# Patient Record
Sex: FEMALE | Race: WHITE | NOT HISPANIC OR LATINO
[De-identification: names, ages, dates, MRNs, and addresses within clinical notes are randomized per-mention and may not be internally consistent; named-entity substitution may affect disease eponyms.]

---

## 2022-04-05 PROBLEM — Z00.00 ENCOUNTER FOR PREVENTIVE HEALTH EXAMINATION: Status: ACTIVE | Noted: 2022-04-05

## 2022-04-08 ENCOUNTER — APPOINTMENT (OUTPATIENT)
Dept: NEUROSURGERY | Facility: CLINIC | Age: 57
End: 2022-04-08
Payer: MEDICAID

## 2022-04-08 VITALS
HEIGHT: 67 IN | TEMPERATURE: 97.8 F | WEIGHT: 110 LBS | DIASTOLIC BLOOD PRESSURE: 82 MMHG | BODY MASS INDEX: 17.27 KG/M2 | SYSTOLIC BLOOD PRESSURE: 110 MMHG

## 2022-04-08 PROCEDURE — 99204 OFFICE O/P NEW MOD 45 MIN: CPT

## 2022-04-08 NOTE — ASSESSMENT
[FreeTextEntry1] : This is a 57-year-old female with longstanding severe low back pain and lumbar neurogenic claudication.  She has had a full extensive trial of conservative measures over the past several years as well as inability to perform physical therapy epidural steroid injections and medication trials.  In order to complete a neurosurgical evaluation I would like to obtain an MRI lumbar spine without contrast.  She can follow-up with me when the images are completed for my review.  In the meantime she can continue with her pain management options

## 2022-04-08 NOTE — HISTORY OF PRESENT ILLNESS
[FreeTextEntry1] : Neck pain and low back pain [de-identified] : This is a 57-year-old female complains of 10 years of chronic low back and neck pain.  Primary complaint is low back pain with radiation down her lower extremities left greater than right.  She reports numbness tingling and weakness in her legs.  She states that she cannot stand for any significant period of time.  The pain is severe constant without relieving or aggravating rating factors.  She states that the pain inhibits her from walking but denies any balance problems.  Denies any incontinence.  She is presently not on medication but has tried multiple different avenues of medical pain management.  She has done physical therapy recently but it has caused her to have exacerbation in her symptoms.  She is presently a patient of pain management and has had multiple epidural steroid injections as well as facet injections.  She is also had chiropractic care in the past.  She has no recent imaging today for review.  She would like to pursue a surgical option

## 2022-05-06 ENCOUNTER — APPOINTMENT (OUTPATIENT)
Dept: NEUROSURGERY | Facility: CLINIC | Age: 57
End: 2022-05-06
Payer: MEDICAID

## 2022-05-06 VITALS — DIASTOLIC BLOOD PRESSURE: 86 MMHG | TEMPERATURE: 98.6 F | HEART RATE: 103 BPM | SYSTOLIC BLOOD PRESSURE: 120 MMHG

## 2022-05-06 PROCEDURE — 99214 OFFICE O/P EST MOD 30 MIN: CPT

## 2022-05-06 NOTE — HISTORY OF PRESENT ILLNESS
[FreeTextEntry1] : The 7-year-old female returns today with her review of MRI regards to her low back pain and bilateral leg pain.  She states the pain continues to be severe occurs on a daily basis.  It inhibits her from all of her activities.  She wears a lumbar support orthotic.  She reports numbness tingling in bilateral legs.  She feels weak in bilateral legs.  She is under the care of Dr. Valdez and is receiving epidural steroid injections which do provide her with short-term relief

## 2022-05-06 NOTE — RESULTS/DATA
[FreeTextEntry1] : Review of MRI from [facility] shows multilevel degenerative disc disease with mild degree of stenosis seen at L3-L4 L4-L5 and L5-S1 no severe central canal compromise or severe nerve root encroachment

## 2022-05-06 NOTE — REASON FOR VISIT
[Follow-Up: _____] : a [unfilled] follow-up visit [FreeTextEntry1] : PT is here to review the MRI done on 04/25/2022.

## 2022-05-06 NOTE — ASSESSMENT
[FreeTextEntry1] : In summary this 57-year-old female with chronic low back and bilateral leg pain presents today for MRI review.  At this point based upon the imaging available today would not recommend any surgery at this time.  I would pursue all pain management options including spinal cord stimulator as well as other procedures available for pain management.  She should return to pain management doctor.  She advised that she would like to possibly be evaluated by her cervical spine.  She can get with them by her pain management doctor and we would be happy to review her MRIs at any point once completed the patient was seen with Dr. Truong who formulated the plan all her questions were answered and she was satisfied with the visit

## 2022-06-08 ENCOUNTER — APPOINTMENT (OUTPATIENT)
Dept: NEUROSURGERY | Facility: CLINIC | Age: 57
End: 2022-06-08
Payer: MEDICAID

## 2022-06-08 VITALS — SYSTOLIC BLOOD PRESSURE: 126 MMHG | DIASTOLIC BLOOD PRESSURE: 84 MMHG | TEMPERATURE: 97.2 F

## 2022-06-08 DIAGNOSIS — M54.2 CERVICALGIA: ICD-10-CM

## 2022-06-08 DIAGNOSIS — M79.605 LOW BACK PAIN, UNSPECIFIED: ICD-10-CM

## 2022-06-08 DIAGNOSIS — M54.50 LOW BACK PAIN, UNSPECIFIED: ICD-10-CM

## 2022-06-08 PROCEDURE — 99214 OFFICE O/P EST MOD 30 MIN: CPT

## 2022-06-08 NOTE — HISTORY OF PRESENT ILLNESS
[FreeTextEntry1] : patient previously seen for lumbar issues would like her cervical spine evaluated and has mri cervical spine\par complains of neck pain\par arm pain\par no new nt, weakness, balance issues, or bladder issues\par reports shoulder pain\par

## 2022-06-08 NOTE — ASSESSMENT
[FreeTextEntry1] : Summary this nice 57-year-old female has multiple complaints of low back\par Bilateral pain with thoracic pain.  Due to her multiple nonspecific complaints I do not believe this patient is a good candidate for any neurosurgical intervention.  I recommend continued conservative management which is included but not limited to pain management procedures\par There is no surgery needed or required for cervical or lumbar spine.  I discharged her into the care of pain management Dr. Del Rosario

## 2022-06-08 NOTE — RESULTS/DATA
[FreeTextEntry1] : mri cervical spine without shows ddd at c5-6 and smal hnp without cord compression

## 2022-07-18 ENCOUNTER — APPOINTMENT (OUTPATIENT)
Dept: NEUROSURGERY | Facility: CLINIC | Age: 57
End: 2022-07-18

## 2022-07-26 ENCOUNTER — APPOINTMENT (OUTPATIENT)
Dept: NEUROSURGERY | Facility: CLINIC | Age: 57
End: 2022-07-26

## 2023-11-07 ENCOUNTER — APPOINTMENT (OUTPATIENT)
Dept: ORTHOPEDIC SURGERY | Facility: CLINIC | Age: 58
End: 2023-11-07
Payer: MEDICAID

## 2023-11-07 VITALS
BODY MASS INDEX: 17.27 KG/M2 | WEIGHT: 110 LBS | HEART RATE: 56 BPM | SYSTOLIC BLOOD PRESSURE: 94 MMHG | DIASTOLIC BLOOD PRESSURE: 68 MMHG | HEIGHT: 67 IN

## 2023-11-07 VITALS — DIASTOLIC BLOOD PRESSURE: 78 MMHG | SYSTOLIC BLOOD PRESSURE: 111 MMHG

## 2023-11-07 DIAGNOSIS — M50.90 CERVICAL DISC DISORDER, UNSPECIFIED, UNSPECIFIED CERVICAL REGION: ICD-10-CM

## 2023-11-07 PROCEDURE — 72100 X-RAY EXAM L-S SPINE 2/3 VWS: CPT

## 2023-11-07 PROCEDURE — 99204 OFFICE O/P NEW MOD 45 MIN: CPT | Mod: 25

## 2023-11-21 ENCOUNTER — APPOINTMENT (OUTPATIENT)
Dept: ORTHOPEDIC SURGERY | Facility: CLINIC | Age: 58
End: 2023-11-21
Payer: MEDICAID

## 2023-11-21 DIAGNOSIS — M47.816 SPONDYLOSIS W/OUT MYELOPATHY OR RADICULOPATHY, LUMBAR REGION: ICD-10-CM

## 2023-11-21 DIAGNOSIS — Z72.0 TOBACCO USE: ICD-10-CM

## 2023-11-21 DIAGNOSIS — M54.50 LOW BACK PAIN, UNSPECIFIED: ICD-10-CM

## 2023-11-21 DIAGNOSIS — G89.29 LOW BACK PAIN, UNSPECIFIED: ICD-10-CM

## 2023-11-21 PROCEDURE — 99215 OFFICE O/P EST HI 40 MIN: CPT

## 2023-12-20 ENCOUNTER — OUTPATIENT (OUTPATIENT)
Dept: OUTPATIENT SERVICES | Facility: HOSPITAL | Age: 58
LOS: 1 days | End: 2023-12-20
Payer: COMMERCIAL

## 2023-12-20 VITALS
TEMPERATURE: 98 F | RESPIRATION RATE: 18 BRPM | HEIGHT: 67 IN | HEART RATE: 70 BPM | SYSTOLIC BLOOD PRESSURE: 92 MMHG | WEIGHT: 108.03 LBS | OXYGEN SATURATION: 97 % | DIASTOLIC BLOOD PRESSURE: 60 MMHG

## 2023-12-20 DIAGNOSIS — F17.200 NICOTINE DEPENDENCE, UNSPECIFIED, UNCOMPLICATED: ICD-10-CM

## 2023-12-20 DIAGNOSIS — Z01.818 ENCOUNTER FOR OTHER PREPROCEDURAL EXAMINATION: ICD-10-CM

## 2023-12-20 DIAGNOSIS — Z98.890 OTHER SPECIFIED POSTPROCEDURAL STATES: Chronic | ICD-10-CM

## 2023-12-20 DIAGNOSIS — M47.816 SPONDYLOSIS WITHOUT MYELOPATHY OR RADICULOPATHY, LUMBAR REGION: ICD-10-CM

## 2023-12-20 DIAGNOSIS — N70.91 SALPINGITIS, UNSPECIFIED: Chronic | ICD-10-CM

## 2023-12-20 DIAGNOSIS — Z29.9 ENCOUNTER FOR PROPHYLACTIC MEASURES, UNSPECIFIED: ICD-10-CM

## 2023-12-20 LAB
A1C WITH ESTIMATED AVERAGE GLUCOSE RESULT: 5.4 % — SIGNIFICANT CHANGE UP (ref 4–5.6)
A1C WITH ESTIMATED AVERAGE GLUCOSE RESULT: 5.4 % — SIGNIFICANT CHANGE UP (ref 4–5.6)
ANION GAP SERPL CALC-SCNC: 12 MMOL/L — SIGNIFICANT CHANGE UP (ref 5–17)
ANION GAP SERPL CALC-SCNC: 12 MMOL/L — SIGNIFICANT CHANGE UP (ref 5–17)
APTT BLD: 39 SEC — HIGH (ref 24.5–35.6)
APTT BLD: 39 SEC — HIGH (ref 24.5–35.6)
BASOPHILS # BLD AUTO: 0.02 K/UL — SIGNIFICANT CHANGE UP (ref 0–0.2)
BASOPHILS # BLD AUTO: 0.02 K/UL — SIGNIFICANT CHANGE UP (ref 0–0.2)
BASOPHILS NFR BLD AUTO: 0.3 % — SIGNIFICANT CHANGE UP (ref 0–2)
BASOPHILS NFR BLD AUTO: 0.3 % — SIGNIFICANT CHANGE UP (ref 0–2)
BLD GP AB SCN SERPL QL: SIGNIFICANT CHANGE UP
BLD GP AB SCN SERPL QL: SIGNIFICANT CHANGE UP
BUN SERPL-MCNC: 9.3 MG/DL — SIGNIFICANT CHANGE UP (ref 8–20)
BUN SERPL-MCNC: 9.3 MG/DL — SIGNIFICANT CHANGE UP (ref 8–20)
CALCIUM SERPL-MCNC: 8.9 MG/DL — SIGNIFICANT CHANGE UP (ref 8.4–10.5)
CALCIUM SERPL-MCNC: 8.9 MG/DL — SIGNIFICANT CHANGE UP (ref 8.4–10.5)
CHLORIDE SERPL-SCNC: 99 MMOL/L — SIGNIFICANT CHANGE UP (ref 96–108)
CHLORIDE SERPL-SCNC: 99 MMOL/L — SIGNIFICANT CHANGE UP (ref 96–108)
CO2 SERPL-SCNC: 25 MMOL/L — SIGNIFICANT CHANGE UP (ref 22–29)
CO2 SERPL-SCNC: 25 MMOL/L — SIGNIFICANT CHANGE UP (ref 22–29)
CREAT SERPL-MCNC: 0.72 MG/DL — SIGNIFICANT CHANGE UP (ref 0.5–1.3)
CREAT SERPL-MCNC: 0.72 MG/DL — SIGNIFICANT CHANGE UP (ref 0.5–1.3)
EGFR: 97 ML/MIN/1.73M2 — SIGNIFICANT CHANGE UP
EGFR: 97 ML/MIN/1.73M2 — SIGNIFICANT CHANGE UP
EOSINOPHIL # BLD AUTO: 0.03 K/UL — SIGNIFICANT CHANGE UP (ref 0–0.5)
EOSINOPHIL # BLD AUTO: 0.03 K/UL — SIGNIFICANT CHANGE UP (ref 0–0.5)
EOSINOPHIL NFR BLD AUTO: 0.5 % — SIGNIFICANT CHANGE UP (ref 0–6)
EOSINOPHIL NFR BLD AUTO: 0.5 % — SIGNIFICANT CHANGE UP (ref 0–6)
ESTIMATED AVERAGE GLUCOSE: 108 MG/DL — SIGNIFICANT CHANGE UP (ref 68–114)
ESTIMATED AVERAGE GLUCOSE: 108 MG/DL — SIGNIFICANT CHANGE UP (ref 68–114)
GLUCOSE SERPL-MCNC: 91 MG/DL — SIGNIFICANT CHANGE UP (ref 70–99)
GLUCOSE SERPL-MCNC: 91 MG/DL — SIGNIFICANT CHANGE UP (ref 70–99)
HCT VFR BLD CALC: 38.2 % — SIGNIFICANT CHANGE UP (ref 34.5–45)
HCT VFR BLD CALC: 38.2 % — SIGNIFICANT CHANGE UP (ref 34.5–45)
HGB BLD-MCNC: 13.1 G/DL — SIGNIFICANT CHANGE UP (ref 11.5–15.5)
HGB BLD-MCNC: 13.1 G/DL — SIGNIFICANT CHANGE UP (ref 11.5–15.5)
IMM GRANULOCYTES NFR BLD AUTO: 0.2 % — SIGNIFICANT CHANGE UP (ref 0–0.9)
IMM GRANULOCYTES NFR BLD AUTO: 0.2 % — SIGNIFICANT CHANGE UP (ref 0–0.9)
INR BLD: 0.99 RATIO — SIGNIFICANT CHANGE UP (ref 0.85–1.18)
INR BLD: 0.99 RATIO — SIGNIFICANT CHANGE UP (ref 0.85–1.18)
LYMPHOCYTES # BLD AUTO: 1.7 K/UL — SIGNIFICANT CHANGE UP (ref 1–3.3)
LYMPHOCYTES # BLD AUTO: 1.7 K/UL — SIGNIFICANT CHANGE UP (ref 1–3.3)
LYMPHOCYTES # BLD AUTO: 29.2 % — SIGNIFICANT CHANGE UP (ref 13–44)
LYMPHOCYTES # BLD AUTO: 29.2 % — SIGNIFICANT CHANGE UP (ref 13–44)
MCHC RBC-ENTMCNC: 32.3 PG — SIGNIFICANT CHANGE UP (ref 27–34)
MCHC RBC-ENTMCNC: 32.3 PG — SIGNIFICANT CHANGE UP (ref 27–34)
MCHC RBC-ENTMCNC: 34.3 GM/DL — SIGNIFICANT CHANGE UP (ref 32–36)
MCHC RBC-ENTMCNC: 34.3 GM/DL — SIGNIFICANT CHANGE UP (ref 32–36)
MCV RBC AUTO: 94.1 FL — SIGNIFICANT CHANGE UP (ref 80–100)
MCV RBC AUTO: 94.1 FL — SIGNIFICANT CHANGE UP (ref 80–100)
MONOCYTES # BLD AUTO: 0.28 K/UL — SIGNIFICANT CHANGE UP (ref 0–0.9)
MONOCYTES # BLD AUTO: 0.28 K/UL — SIGNIFICANT CHANGE UP (ref 0–0.9)
MONOCYTES NFR BLD AUTO: 4.8 % — SIGNIFICANT CHANGE UP (ref 2–14)
MONOCYTES NFR BLD AUTO: 4.8 % — SIGNIFICANT CHANGE UP (ref 2–14)
NEUTROPHILS # BLD AUTO: 3.79 K/UL — SIGNIFICANT CHANGE UP (ref 1.8–7.4)
NEUTROPHILS # BLD AUTO: 3.79 K/UL — SIGNIFICANT CHANGE UP (ref 1.8–7.4)
NEUTROPHILS NFR BLD AUTO: 65 % — SIGNIFICANT CHANGE UP (ref 43–77)
NEUTROPHILS NFR BLD AUTO: 65 % — SIGNIFICANT CHANGE UP (ref 43–77)
PLATELET # BLD AUTO: 262 K/UL — SIGNIFICANT CHANGE UP (ref 150–400)
PLATELET # BLD AUTO: 262 K/UL — SIGNIFICANT CHANGE UP (ref 150–400)
POTASSIUM SERPL-MCNC: 4.1 MMOL/L — SIGNIFICANT CHANGE UP (ref 3.5–5.3)
POTASSIUM SERPL-MCNC: 4.1 MMOL/L — SIGNIFICANT CHANGE UP (ref 3.5–5.3)
POTASSIUM SERPL-SCNC: 4.1 MMOL/L — SIGNIFICANT CHANGE UP (ref 3.5–5.3)
POTASSIUM SERPL-SCNC: 4.1 MMOL/L — SIGNIFICANT CHANGE UP (ref 3.5–5.3)
PROTHROM AB SERPL-ACNC: 11 SEC — SIGNIFICANT CHANGE UP (ref 9.5–13)
PROTHROM AB SERPL-ACNC: 11 SEC — SIGNIFICANT CHANGE UP (ref 9.5–13)
RBC # BLD: 4.06 M/UL — SIGNIFICANT CHANGE UP (ref 3.8–5.2)
RBC # BLD: 4.06 M/UL — SIGNIFICANT CHANGE UP (ref 3.8–5.2)
RBC # FLD: 12.8 % — SIGNIFICANT CHANGE UP (ref 10.3–14.5)
RBC # FLD: 12.8 % — SIGNIFICANT CHANGE UP (ref 10.3–14.5)
SODIUM SERPL-SCNC: 136 MMOL/L — SIGNIFICANT CHANGE UP (ref 135–145)
SODIUM SERPL-SCNC: 136 MMOL/L — SIGNIFICANT CHANGE UP (ref 135–145)
WBC # BLD: 5.83 K/UL — SIGNIFICANT CHANGE UP (ref 3.8–10.5)
WBC # BLD: 5.83 K/UL — SIGNIFICANT CHANGE UP (ref 3.8–10.5)
WBC # FLD AUTO: 5.83 K/UL — SIGNIFICANT CHANGE UP (ref 3.8–10.5)
WBC # FLD AUTO: 5.83 K/UL — SIGNIFICANT CHANGE UP (ref 3.8–10.5)

## 2023-12-20 PROCEDURE — 93005 ELECTROCARDIOGRAM TRACING: CPT

## 2023-12-20 PROCEDURE — 93010 ELECTROCARDIOGRAM REPORT: CPT

## 2023-12-20 PROCEDURE — G0463: CPT

## 2023-12-20 NOTE — H&P PST ADULT - PROBLEM SELECTOR PLAN 2
urine nicotine/metabolites collected in PST.  counseled on cessation and working on cutting down. urine nicotine/metabolites collected in PST.  counseled on cessation and working on cutting down.  check CXR in PST.

## 2023-12-20 NOTE — H&P PST ADULT - PROBLEM SELECTOR PLAN 1
L4-L5, L5-S1 instrumented fusion, interbody fusion laminectomy and posterior lateral fusion, now scheduled 01/08/2023 with Dr. Rubin  Spine booklet provided, ERP protocol reviewed, MSSA/MRSA swab done in pst, results pending.   urine nicotine/metabolites collected in PST.

## 2023-12-20 NOTE — H&P PST ADULT - NSICDXPASTMEDICALHX_GEN_ALL_CORE_FT
PAST MEDICAL HISTORY:  Cervical spine pain     Current every day smoker     H/O osteoporosis     HLD (hyperlipidemia)     Spondylosis without myelopathy or radiculopathy, lumbar region

## 2023-12-20 NOTE — H&P PST ADULT - HISTORY OF PRESENT ILLNESS
58-year-old female pmh Cervical disc disease/ Chronic low back pain/ Low back pain/ Lumbar spondylosis, with a longstanding history of 4 5 and 5 1 lumbar degenerative disc disease and chronic low back pain. Patient states been going on approximately 20 years she is exhausted multiple courses of physical therapy and injection therapy. And wishes to discuss again surgical management. She presents with an MRI for review and surgical options.  per DR. Rubin- longstanding history of low back pain, per patient she will suffer from incomplete resolution of back pain, failed physical therapy, failed injection therapy, recommended for L4-L5, L5-S1 instrumented fusion, interbody fusion laminectomy and posterior lateral fusion, shceudled 01/08/2023.      She states the symptoms are worsening. The patient mentions the symptoms started 20+ year(s) ago. Pain levels include a current pain level of 10/10 and a maximum pain level of 10/10.   She states the symptoms seem to be constant.      Modifying factors - worsened by bending and worsened by lifting. No relieving factors are noted.    Associated Symptoms: no ataxia, no incontinence, good dexterity and no urinary retention.        58-year-old female pmhx HLD/ osteoporosis not yet on medication/ chronic daily smoker >30year pack hx/Cervical disc disease/ Chronic low back pain/ Lumbar spondylosis, presents for PST. Patient with longstanding history of 4-5 and 5-1 lumbar degenerative disc disease and chronic low back pain, going on approximately 20 years. pain is described as 10/10 sharp/ stabbing, radiation to BLE, worsened by bending and worsened by lifting. Conservative mgmt includes NSAIDs, PT, injection therapy with no relief- she is exhausted multiple courses of physical therapy and injection therapy. As per DR. Rubin- longstanding history of low back pain, per patient she will suffer from incomplete resolution of back pain, failed physical therapy, failed injection therapy, recommended for L4-L5, L5-S1 instrumented fusion, interbody fusion laminectomy and posterior lateral fusion, now scheduled 01/08/2023. Reports gait is limited 2/2 pain, and also a/w neuropathy ernie/ hands and feet. o/w she reports feeling healthy today with no acute concerns. denies use of ast. device. denies use of bracing.

## 2023-12-20 NOTE — H&P PST ADULT - ASSESSMENT
58-year-old female pmhx HLD/ osteoporosis not yet on medication/ chronic daily smoker >30year pack hx/Cervical disc disease/ Chronic low back pain/ Lumbar spondylosis, presents for PST. Patient with longstanding history of 4-5 and 5-1 lumbar degenerative disc disease and chronic low back pain, going on approximately 20 years. pain is described as 10/10 sharp/ stabbing, radiation to BLE, worsened by bending and worsened by lifting. Conservative mgmt includes NSAIDs, PT, injection therapy with no relief- she is exhausted multiple courses of physical therapy and injection therapy. As per DR. Rubin- longstanding history of low back pain, per patient she will suffer from incomplete resolution of back pain, failed physical therapy, failed injection therapy, recommended for L4-L5, L5-S1 instrumented fusion, interbody fusion laminectomy and posterior lateral fusion, now scheduled 2023    CAPRINI SCORE    AGE RELATED RISK FACTORS                                                             [x ] Age 41-60 years                                            (1 Point)  [ ] Age: 61-74 years                                           (2 Points)                 [ ] Age= 75 years                                                (3 Points)             DISEASE RELATED RISK FACTORS                                                       [ ] Edema in the lower extremities                 (1 Point)                     [ ] Varicose veins                                               (1 Point)                                 [ ] BMI > 25 Kg/m2                                            (1 Point)                                  [ ] Serious infection (ie PNA)                            (1 Point)                     [ ] Lung disease ( COPD, Emphysema)            (1 Point)                                                                          [ ] Acute myocardial infarction                         (1 Point)                  [ ] Congestive heart failure (in the previous month)  (1 Point)         [ ] Inflammatory bowel disease                            (1 Point)                  [ ] Central venous access, PICC or Port               (2 points)       (within the last month)                                                                [ ] Stroke (in the previous month)                        (5 Points)    [ ] Previous or present malignancy                       (2 points)                                                                                                                                                         HEMATOLOGY RELATED FACTORS                                                         [ ] Prior episodes of VTE                                     (3 Points)                     [ ] Positive family history for VTE                      (3 Points)                  [ ] Prothrombin 64013 A                                     (3 Points)                     [ ] Factor V Leiden                                                (3 Points)                        [ ] Lupus anticoagulants                                      (3 Points)                                                           [ ] Anticardiolipin antibodies                              (3 Points)                                                       [ ] High homocysteine in the blood                   (3 Points)                                             [ ] Other congenital or acquired thrombophilia      (3 Points)                                                [ ] Heparin induced thrombocytopenia                  (3 Points)                                        MOBILITY RELATED FACTORS  [ ] Bed rest                                                         (1 Point)  [ ] Plaster cast                                                    (2 points)  [ ] Bed bound for more than 72 hours           (2 Points)    GENDER SPECIFIC FACTORS  [ ] Pregnancy or had a baby within the last month   (1 Point)  [ ] Post-partum < 6 weeks                                   (1 Point)  [ ] Hormonal therapy  or oral contraception   (1 Point)  [ ] History of pregnancy complications              (1 point)  [ ] Unexplained or recurrent              (1 Point)    OTHER RISK FACTORS                                           (1 Point)  [ x] BMI >40, smoking, diabetes requiring insulin, chemotherapy  blood transfusions and length of surgery over 2 hours    SURGERY RELATED RISK FACTORS  [ ]  Section within the last month     (1 Point)  [ ] Minor surgery                                                  (1 Point)  [ ] Arthroscopic surgery                                       (2 Points)  [x ] Planned major surgery lasting more            (2 Points)      than 45 minutes     [ ] Elective hip or knee joint replacement       (5 points)       surgery                                                TRAUMA RELATED RISK FACTORS  [ ] Fracture of the hip, pelvis, or leg                       (5 Points)  [ ] Spinal cord injury resulting in paralysis             (5 points)       (in the previous month)    [ ] Paralysis  (less than 1 month)                             (5 Points)  [ ] Multiple Trauma within 1 month                        (5 Points)    Total Score [   4     ]    Caprini Score 0-2: Low Risk, NO VTE prophylaxis required for most patients, encourage ambulation  Caprini Score 3-6: Moderate Risk , pharmacologic VTE prophylaxis is indicated for most patients (in the absence of contraindications)  Caprini Score Greater than or =7: High risk, pharmocologic VTE prophylaxis indicated for most patients (in the absence of contraindications)                              OPIOID RISK TOOL    GATO EACH BOX THAT APPLIES AND ADD TOTALS AT THE END    FAMILY HISTORY OF SUBSTANCE ABUSE                 FEMALE         MALE                                                Alcohol                             [  ]1 pt          [  ]3pts                                               Illegal Durgs                     [  ]2 pts        [  ]3pts                                               Rx Drugs                           [  ]4 pts        [  ]4 pts    PERSONAL HISTORY OF SUBSTANCE ABUSE                                                                                          Alcohol                             [  ]3 pts       [  ]3 pts                                               Illegal Drugs                     [  ]4 pts        [  ]4 pts                                               Rx Drugs                           [  ]5 pts        [  ]5 pts    AGE BETWEEN 16-45 YEARS                                      [  ]1 pt         [  ]1 pt    HISTORY OF PREADOLESCENT   SEXUAL ABUSE                                                             [  ]3 pts        [  ]0pts    PSYCHOLOGICAL DISEASE                     ADD, OCD, Bipolar, Schizophrenia        [  ]2 pts         [  ]2 pts                      Depression                                               [  ]1 pt           [  ]1 pt           SCORING TOTAL   (add numbers and type here)              (0)                                     A score of 3 or lower indicated LOW risk for future opioid abuse  A score of 4 to 7 indicated moderate risk for future opioid abuse  A score of 8 or higher indicates a high risk for opioid abuse     58-year-old female pmhx HLD/ osteoporosis not yet on medication/ chronic daily smoker >30year pack hx/Cervical disc disease/ Chronic low back pain/ Lumbar spondylosis, presents for PST. Patient with longstanding history of 4-5 and 5-1 lumbar degenerative disc disease and chronic low back pain, going on approximately 20 years. pain is described as 10/10 sharp/ stabbing, radiation to BLE, worsened by bending and worsened by lifting. Conservative mgmt includes NSAIDs, PT, injection therapy with no relief- she is exhausted multiple courses of physical therapy and injection therapy. As per DR. Rubin- longstanding history of low back pain, per patient she will suffer from incomplete resolution of back pain, failed physical therapy, failed injection therapy, recommended for L4-L5, L5-S1 instrumented fusion, interbody fusion laminectomy and posterior lateral fusion, now scheduled 2023    CAPRINI SCORE    AGE RELATED RISK FACTORS                                                             [x ] Age 41-60 years                                            (1 Point)  [ ] Age: 61-74 years                                           (2 Points)                 [ ] Age= 75 years                                                (3 Points)             DISEASE RELATED RISK FACTORS                                                       [ ] Edema in the lower extremities                 (1 Point)                     [ ] Varicose veins                                               (1 Point)                                 [ ] BMI > 25 Kg/m2                                            (1 Point)                                  [ ] Serious infection (ie PNA)                            (1 Point)                     [ ] Lung disease ( COPD, Emphysema)            (1 Point)                                                                          [ ] Acute myocardial infarction                         (1 Point)                  [ ] Congestive heart failure (in the previous month)  (1 Point)         [ ] Inflammatory bowel disease                            (1 Point)                  [ ] Central venous access, PICC or Port               (2 points)       (within the last month)                                                                [ ] Stroke (in the previous month)                        (5 Points)    [ ] Previous or present malignancy                       (2 points)                                                                                                                                                         HEMATOLOGY RELATED FACTORS                                                         [ ] Prior episodes of VTE                                     (3 Points)                     [ ] Positive family history for VTE                      (3 Points)                  [ ] Prothrombin 21697 A                                     (3 Points)                     [ ] Factor V Leiden                                                (3 Points)                        [ ] Lupus anticoagulants                                      (3 Points)                                                           [ ] Anticardiolipin antibodies                              (3 Points)                                                       [ ] High homocysteine in the blood                   (3 Points)                                             [ ] Other congenital or acquired thrombophilia      (3 Points)                                                [ ] Heparin induced thrombocytopenia                  (3 Points)                                        MOBILITY RELATED FACTORS  [ ] Bed rest                                                         (1 Point)  [ ] Plaster cast                                                    (2 points)  [ ] Bed bound for more than 72 hours           (2 Points)    GENDER SPECIFIC FACTORS  [ ] Pregnancy or had a baby within the last month   (1 Point)  [ ] Post-partum < 6 weeks                                   (1 Point)  [ ] Hormonal therapy  or oral contraception   (1 Point)  [ ] History of pregnancy complications              (1 point)  [ ] Unexplained or recurrent              (1 Point)    OTHER RISK FACTORS                                           (1 Point)  [ x] BMI >40, smoking, diabetes requiring insulin, chemotherapy  blood transfusions and length of surgery over 2 hours    SURGERY RELATED RISK FACTORS  [ ]  Section within the last month     (1 Point)  [ ] Minor surgery                                                  (1 Point)  [ ] Arthroscopic surgery                                       (2 Points)  [x ] Planned major surgery lasting more            (2 Points)      than 45 minutes     [ ] Elective hip or knee joint replacement       (5 points)       surgery                                                TRAUMA RELATED RISK FACTORS  [ ] Fracture of the hip, pelvis, or leg                       (5 Points)  [ ] Spinal cord injury resulting in paralysis             (5 points)       (in the previous month)    [ ] Paralysis  (less than 1 month)                             (5 Points)  [ ] Multiple Trauma within 1 month                        (5 Points)    Total Score [   4     ]    Caprini Score 0-2: Low Risk, NO VTE prophylaxis required for most patients, encourage ambulation  Caprini Score 3-6: Moderate Risk , pharmacologic VTE prophylaxis is indicated for most patients (in the absence of contraindications)  Caprini Score Greater than or =7: High risk, pharmocologic VTE prophylaxis indicated for most patients (in the absence of contraindications)                              OPIOID RISK TOOL    GATO EACH BOX THAT APPLIES AND ADD TOTALS AT THE END    FAMILY HISTORY OF SUBSTANCE ABUSE                 FEMALE         MALE                                                Alcohol                             [  ]1 pt          [  ]3pts                                               Illegal Durgs                     [  ]2 pts        [  ]3pts                                               Rx Drugs                           [  ]4 pts        [  ]4 pts    PERSONAL HISTORY OF SUBSTANCE ABUSE                                                                                          Alcohol                             [  ]3 pts       [  ]3 pts                                               Illegal Drugs                     [  ]4 pts        [  ]4 pts                                               Rx Drugs                           [  ]5 pts        [  ]5 pts    AGE BETWEEN 16-45 YEARS                                      [  ]1 pt         [  ]1 pt    HISTORY OF PREADOLESCENT   SEXUAL ABUSE                                                             [  ]3 pts        [  ]0pts    PSYCHOLOGICAL DISEASE                     ADD, OCD, Bipolar, Schizophrenia        [  ]2 pts         [  ]2 pts                      Depression                                               [  ]1 pt           [  ]1 pt           SCORING TOTAL   (add numbers and type here)              (0)                                     A score of 3 or lower indicated LOW risk for future opioid abuse  A score of 4 to 7 indicated moderate risk for future opioid abuse  A score of 8 or higher indicates a high risk for opioid abuse

## 2023-12-20 NOTE — H&P PST ADULT - EKG AND INTERPRETATION
SR VR63, septal infarct age undetermined  no acute change from previous ECG completed 11/16/2023 with PCP.  pending final read and medical clearance.

## 2023-12-20 NOTE — H&P PST ADULT - BLOOD AVOIDANCE/RESTRICTIONS, PROFILE
Subjective   History of Present Illness  Patient is a pleasant 81-year-old female with significant recent past medical history who presents today with altered mental status.  The daughters are the primary historians.  They state that patient was relatively healthy until January 10 of this year.  She had a stroke which resulted in right-sided weakness.  Right side has been weak since that time.  Now on February 9 the ischemic infarct compared to the hemorrhagic and she was readmitted.  Her right-sided deficits persisted.  On March 10, approximately 3 weeks ago, she came apically agitated, confused and combative and went back to the hospital in [place].  Family states you had multiple diagnoses on that visit including a possible pneumonia, left ear infection, and likely most importantly a positive UTI.  She was given antibiotics and did improve after this episode over the past 2 days the same symptoms have been returning.  They state that she has become less responsive with intermittent episodes of agitation and combativeness and hitting and scraping her caregivers and family.  This is a reason for presentation today.  States she has not drank or eaten anything in the past 2 days.  Denies definitive fever.  No complaint of chest pain.  No obvious difficulty breathing.  No vomiting no diarrhea.  She has noticed a right-sided deficits but they deny any new weakness.        Review of Systems   Unable to perform ROS: Mental status change       Past Medical History:   Diagnosis Date   • Abnormal heart rhythm    • Anxiety    • Arrhythmia    • Arthritis    • Atrial fibrillation (HCC)    • Dementia (HCC)    • Diabetes mellitus (HCC)    • Hyperlipidemia    • Hypertension    • Kidney disorder    • Stroke (HCC)    • Uterus cancer (HCC)        Allergies   Allergen Reactions   • Penicillins    • Sulfa Antibiotics    • Tetracyclines & Related Unknown (See Comments)   • Valium [Diazepam] Anxiety       Past Surgical History:    Procedure Laterality Date   • CATARACT EXTRACTION, BILATERAL         Family History   Problem Relation Age of Onset   • Heart disease Mother    • Cancer Mother    • Congenital heart disease Mother    • Cancer Father    • Cancer Brother    • Alcohol abuse Brother        Social History     Socioeconomic History   • Marital status:    Tobacco Use   • Smoking status: Never   • Smokeless tobacco: Never   Vaping Use   • Vaping Use: Never used   Substance and Sexual Activity   • Alcohol use: No   • Drug use: No   • Sexual activity: Defer           Objective   Physical Exam  Vitals and nursing note reviewed.   Constitutional:       Appearance: She is well-developed. She is obese. She is ill-appearing.      Comments: Somnolence.  Decreased responsiveness   HENT:      Head: Normocephalic and atraumatic.      Mouth/Throat:      Mouth: Mucous membranes are moist.      Pharynx: Oropharynx is clear.   Eyes:      Extraocular Movements: Extraocular movements intact.      Conjunctiva/sclera: Conjunctivae normal.      Pupils: Pupils are equal, round, and reactive to light.   Cardiovascular:      Rate and Rhythm: Normal rate and regular rhythm.      Heart sounds: Normal heart sounds.   Pulmonary:      Effort: Pulmonary effort is normal. No respiratory distress.      Breath sounds: Normal breath sounds.   Abdominal:      Palpations: Abdomen is soft.      Tenderness: There is no abdominal tenderness.   Musculoskeletal:         General: Normal range of motion.      Cervical back: Normal range of motion and neck supple.   Skin:     General: Skin is warm and dry.      Capillary Refill: Capillary refill takes less than 2 seconds.   Neurological:      Mental Status: She is lethargic, disoriented and confused.      GCS: GCS eye subscore is 4. GCS verbal subscore is 4. GCS motor subscore is 5.      Motor: Weakness (Baseline right-sided weakness.  Sensory deficit is difficult to assess secondary to poor cooperation) present.       Comments: No pronator drift   Normal finger to nose and heel to shin    Psychiatric:         Speech: Speech normal.         Behavior: Behavior normal.         Procedures           ED Course  ED Course as of 03/31/23 2255   Fri Mar 31, 2023   1749 Maddy reyes  [CP]      ED Course User Index  [CP] Héctor Vo            Recent Results (from the past 24 hour(s))   ECG 12 Lead ED Triage Standing Order; Weak / Dizzy / AMS    Collection Time: 03/31/23  4:00 PM   Result Value Ref Range    QT Interval 452 ms    QTC Interval 432 ms   Urinalysis With Microscopic If Indicated (No Culture) - Straight Cath    Collection Time: 03/31/23  4:21 PM    Specimen: Straight Cath; Urine   Result Value Ref Range    Color, UA Yellow Yellow, Straw    Appearance, UA Cloudy (A) Clear    pH, UA <=5.0 5.0 - 8.0    Specific Gravity, UA 1.029 1.001 - 1.030    Glucose, UA >=1000 mg/dL (3+) (A) Negative    Ketones, UA Trace (A) Negative    Bilirubin, UA Negative Negative    Blood, UA Negative Negative    Protein, UA 30 mg/dL (1+) (A) Negative    Leuk Esterase, UA Trace (A) Negative    Nitrite, UA Negative Negative    Urobilinogen, UA 0.2 E.U./dL 0.2 - 1.0 E.U./dL   Urinalysis, Microscopic Only - Straight Cath    Collection Time: 03/31/23  4:21 PM    Specimen: Straight Cath; Urine   Result Value Ref Range    RBC, UA 0-2 None Seen, 0-2 /HPF    WBC, UA 13-20 (A) None Seen, 0-2 /HPF    Bacteria, UA 1+ (A) None Seen, Trace /HPF    Squamous Epithelial Cells, UA 7-12 (A) None Seen, 0-2 /HPF    Hyaline Casts, UA 0-6 0 - 6 /LPF    Granular Casts, UA 7-12 None Seen /LPF    Amorphous Crystals, UA Small/1+ None Seen /HPF    Methodology Manual Light Microscopy    Urine Drug Screen - Straight Cath    Collection Time: 03/31/23  4:21 PM    Specimen: Straight Cath; Urine   Result Value Ref Range    THC, Screen, Urine Negative Negative    Phencyclidine (PCP), Urine Negative Negative    Cocaine Screen, Urine Negative Negative    Methamphetamine, Ur Negative Negative     Opiate Screen Positive (A) Negative    Amphetamine Screen, Urine Negative Negative    Benzodiazepine Screen, Urine Positive (A) Negative    Tricyclic Antidepressants Screen Negative Negative    Methadone Screen, Urine Negative Negative    Barbiturates Screen, Urine Negative Negative    Oxycodone Screen, Urine Negative Negative    Propoxyphene Screen Negative Negative    Buprenorphine, Screen, Urine Negative Negative   Respiratory Panel PCR w/COVID-19(SARS-CoV-2) TOMMY/SABRINA/DYLLAN/PAD/COR/MAD/KATHLEEN In-House, NP Swab in UTM/VTM, 3-4 HR TAT - Swab, Nasopharynx    Collection Time: 03/31/23  5:59 PM    Specimen: Nasopharynx; Swab   Result Value Ref Range    ADENOVIRUS, PCR Not Detected Not Detected    Coronavirus 229E Not Detected Not Detected    Coronavirus HKU1 Not Detected Not Detected    Coronavirus NL63 Not Detected Not Detected    Coronavirus OC43 Not Detected Not Detected    COVID19 Not Detected Not Detected - Ref. Range    Human Metapneumovirus Not Detected Not Detected    Human Rhinovirus/Enterovirus Not Detected Not Detected    Influenza A PCR Not Detected Not Detected    Influenza B PCR Not Detected Not Detected    Parainfluenza Virus 1 Not Detected Not Detected    Parainfluenza Virus 2 Not Detected Not Detected    Parainfluenza Virus 3 Not Detected Not Detected    Parainfluenza Virus 4 Not Detected Not Detected    RSV, PCR Not Detected Not Detected    Bordetella pertussis pcr Not Detected Not Detected    Bordetella parapertussis PCR Not Detected Not Detected    Chlamydophila pneumoniae PCR Not Detected Not Detected    Mycoplasma pneumo by PCR Not Detected Not Detected   Comprehensive Metabolic Panel    Collection Time: 03/31/23  6:33 PM    Specimen: Blood   Result Value Ref Range    Glucose 288 (H) 65 - 99 mg/dL    BUN 33 (H) 8 - 23 mg/dL    Creatinine 1.65 (H) 0.57 - 1.00 mg/dL    Sodium 140 136 - 145 mmol/L    Potassium 5.0 3.5 - 5.2 mmol/L    Chloride 101 98 - 107 mmol/L    CO2 30.0 (H) 22.0 - 29.0 mmol/L     Calcium 9.3 8.6 - 10.5 mg/dL    Total Protein 6.5 6.0 - 8.5 g/dL    Albumin 3.5 3.5 - 5.2 g/dL    ALT (SGPT) 5 1 - 33 U/L    AST (SGOT) 17 1 - 32 U/L    Alkaline Phosphatase 54 39 - 117 U/L    Total Bilirubin 0.2 0.0 - 1.2 mg/dL    Globulin 3.0 gm/dL    A/G Ratio 1.2 g/dL    BUN/Creatinine Ratio 20.0 7.0 - 25.0    Anion Gap 9.0 5.0 - 15.0 mmol/L    eGFR 31.1 (L) >60.0 mL/min/1.73   Single High Sensitivity Troponin T    Collection Time: 03/31/23  6:33 PM    Specimen: Blood   Result Value Ref Range    HS Troponin T 45 (H) <10 ng/L   Magnesium    Collection Time: 03/31/23  6:33 PM    Specimen: Blood   Result Value Ref Range    Magnesium 1.6 1.6 - 2.4 mg/dL   Green Top (Gel)    Collection Time: 03/31/23  6:33 PM   Result Value Ref Range    Extra Tube Hold for add-ons.    Lavender Top    Collection Time: 03/31/23  6:33 PM   Result Value Ref Range    Extra Tube hold for add-on    Gold Top - SST    Collection Time: 03/31/23  6:33 PM   Result Value Ref Range    Extra Tube Hold for add-ons.    Light Blue Top    Collection Time: 03/31/23  6:33 PM   Result Value Ref Range    Extra Tube Hold for add-ons.    CBC Auto Differential    Collection Time: 03/31/23  6:33 PM    Specimen: Blood   Result Value Ref Range    WBC 6.91 3.40 - 10.80 10*3/mm3    RBC 3.39 (L) 3.77 - 5.28 10*6/mm3    Hemoglobin 10.9 (L) 12.0 - 15.9 g/dL    Hematocrit 35.1 34.0 - 46.6 %    .5 (H) 79.0 - 97.0 fL    MCH 32.2 26.6 - 33.0 pg    MCHC 31.1 (L) 31.5 - 35.7 g/dL    RDW 12.6 12.3 - 15.4 %    RDW-SD 47.7 37.0 - 54.0 fl    MPV 12.4 (H) 6.0 - 12.0 fL    Platelets 165 140 - 450 10*3/mm3    Neutrophil % 58.3 42.7 - 76.0 %    Lymphocyte % 31.4 19.6 - 45.3 %    Monocyte % 6.5 5.0 - 12.0 %    Eosinophil % 3.3 0.3 - 6.2 %    Basophil % 0.4 0.0 - 1.5 %    Immature Grans % 0.1 0.0 - 0.5 %    Neutrophils, Absolute 4.02 1.70 - 7.00 10*3/mm3    Lymphocytes, Absolute 2.17 0.70 - 3.10 10*3/mm3    Monocytes, Absolute 0.45 0.10 - 0.90 10*3/mm3    Eosinophils,  Absolute 0.23 0.00 - 0.40 10*3/mm3    Basophils, Absolute 0.03 0.00 - 0.20 10*3/mm3    Immature Grans, Absolute 0.01 0.00 - 0.05 10*3/mm3    nRBC 0.0 0.0 - 0.2 /100 WBC   Ammonia    Collection Time: 03/31/23  6:33 PM    Specimen: Blood   Result Value Ref Range    Ammonia <10 (L) 11 - 51 umol/L   Ethanol    Collection Time: 03/31/23  6:33 PM    Specimen: Blood   Result Value Ref Range    Ethanol <10 0 - 10 mg/dL   Procalcitonin    Collection Time: 03/31/23  6:33 PM    Specimen: Blood   Result Value Ref Range    Procalcitonin 0.06 0.00 - 0.25 ng/mL   Lactic Acid, Plasma    Collection Time: 03/31/23  6:33 PM    Specimen: Blood   Result Value Ref Range    Lactate 1.1 0.5 - 2.0 mmol/L   TSH    Collection Time: 03/31/23  6:33 PM    Specimen: Blood   Result Value Ref Range    TSH 1.810 0.270 - 4.200 uIU/mL     Note: In addition to lab results from this visit, the labs listed above may include labs taken at another facility or during a different encounter within the last 24 hours. Please correlate lab times with ED admission and discharge times for further clarification of the services performed during this visit.    XR Abdomen KUB   Final Result   Impression:   1. Nonspecific bowel gas pattern.   2. Moderate stool in the colon.      Electronically Signed: Alvaro Tariq     3/31/2023 10:04 PM EDT     Workstation ID: [id number]      CT Head Without Contrast   Final Result   Impression:      1. No acute intracranial process.   2. Stable encephalomalacia present within the left parietotemporal region related to previous infarct, unchanged as compared to the previous study. Redemonstration of a small amount of hyperdensity present within the area of infarction likely related to    calcification and sequela of previous small hemorrhage, stable as compared to the previous study.   ,       Electronically Signed: Luli Mane     3/31/2023 7:29 PM EDT     Workstation ID: [id number]      XR Chest 1 View   Final Result   Impression:  "  Redemonstrated hypoventilatory appearance of the chest, including prominent left basilar atelectasis.      Electronically Signed: Luis Manuel Myers     3/31/2023 4:11 PM EDT     Workstation ID: [id number]        Vitals:    03/31/23 1550 03/31/23 1555 03/31/23 1630 03/31/23 2145   BP:  110/42 93/49 123/71   BP Location:  Left arm     Patient Position:  Lying     Pulse:  57 57 62   Resp:  14 15    Temp:  97.6 °F (36.4 °C)     TempSrc:  Oral     SpO2:  100% 100% 97%   Weight:       Height: 170.2 cm (67\")        Medications   sodium chloride 0.9 % flush 10 mL (has no administration in time range)   sodium chloride 0.9 % bolus 1,000 mL (1,000 mL Intravenous New Bag 3/31/23 2107)   HYDROcodone-acetaminophen (NORCO) 5-325 MG per tablet 1 tablet (1 tablet Oral Given 3/31/23 2205)   LORazepam (ATIVAN) injection 1 mg (has no administration in time range)   Pharmacy to dose vancomycin (has no administration in time range)   vancomycin IVPB 2000 mg in 0.9% Sodium Chloride (premix) 500 mL (2,000 mg Intravenous New Bag 3/31/23 2208)   vancomycin (dosing per levels) (has no administration in time range)   ondansetron (ZOFRAN) tablet 4 mg (4 mg Oral Given 3/31/23 2214)   bisacodyl (DULCOLAX) suppository 10 mg (has no administration in time range)   sodium chloride 0.9 % bolus 500 mL (0 mL Intravenous Stopped 3/31/23 2022)   sodium chloride 0.9 % bolus 500 mL (0 mL Intravenous Stopped 3/31/23 2104)     ECG/EMG Results (last 24 hours)     Procedure Component Value Units Date/Time    ECG 12 Lead ED Triage Standing Order; Weak / Dizzy / AMS [[id number]] Collected: 03/31/23 1600     Updated: 03/31/23 1600     QT Interval 452 ms      QTC Interval 432 ms     Narrative:      Test Reason : ED Triage Standing Order~  Blood Pressure :   */*   mmHG  Vent. Rate :  55 BPM     Atrial Rate :  55 BPM     P-R Int : 196 ms          QRS Dur :  80 ms      QT Int : 452 ms       P-R-T Axes :  -1 -19  16 degrees     QTc Int : 432 ms    Sinus " bradycardia  Moderate voltage criteria for LVH, may be normal variant  Inferior infarct (cited on or before 10-FEB-2023)  Abnormal ECG  When compared with ECG of 10-FEB-2023 04:38,  No significant change was found    Referred By: ED MD           Confirmed By:         ECG 12 Lead ED Triage Standing Order; Weak / Dizzy / AMS   Preliminary Result   Test Reason : ED Triage Standing Order~   Blood Pressure :   */*   mmHG   Vent. Rate :  55 BPM     Atrial Rate :  55 BPM      P-R Int : 196 ms          QRS Dur :  80 ms       QT Int : 452 ms       P-R-T Axes :  -1 -19  16 degrees      QTc Int : 432 ms      Sinus bradycardia   Moderate voltage criteria for LVH, may be normal variant   Inferior infarct (cited on or before 10-FEB-2023)   Abnormal ECG   When compared with ECG of 10-FEB-2023 04:38,   No significant change was found      Referred By: ED MD           Confirmed By:                                           Medical Decision Making  Exact etiology of patient's presentation is not clear.  Polypharmacy along with possible infectious process consistent with presentation 3 weeks ago.  Family believes it was a urinary tract infection at that time and this could be the case today.  Although it was a cath sample there are still squamous cells in the sample.  Have covered her with antibiotics and discussed the case with internal medicine.  Patient be admitted for further evaluation and management.    Patient has had persistent, slow, improvement throughout her ED stay.    Altered mental status, unspecified altered mental status type: complicated acute illness or injury  Combative behavior: complicated acute illness or injury  Somnolence: complicated acute illness or injury  Amount and/or Complexity of Data Reviewed  External Data Reviewed: notes.  Labs: ordered. Decision-making details documented in ED Course.  Radiology: ordered and independent interpretation performed. Decision-making details documented in ED  Course.  ECG/medicine tests: ordered and independent interpretation performed. Decision-making details documented in ED Course.      Risk  Prescription drug management.  Decision regarding hospitalization.          Final diagnoses:   Altered mental status, unspecified altered mental status type   Combative behavior   Somnolence   Hyperglycemia   Elevated serum creatinine   Elevated troponin       ED Disposition  ED Disposition     ED Disposition   Decision to Admit    Condition   --    Comment   Level of Care: Telemetry [5]   Diagnosis: Confusion [[id number]]   Admitting Physician: MANJINDER NELSON [[id number]]   Attending Physician: MANJINDER NELSON [[id number]]                  Héctor Vo DO  03/31/23 1743     none

## 2023-12-20 NOTE — H&P PST ADULT - PROBLEM SELECTOR PLAN 3
cap 4  Moderate Risk,  SCDs ordered for day of procedure.  Surgical team to assess need for VTE prophylaxis

## 2023-12-20 NOTE — H&P PST ADULT - NEGATIVE NEUROLOGICAL SYMPTOMS
no transient paralysis/no generalized seizures/no focal seizures/no syncope/no vertigo/no loss of sensation

## 2023-12-20 NOTE — H&P PST ADULT - ATTENDING COMMENTS
Attending statement:  I have personally seen this patient, and formed a face to face diagnostic evaluation on this patient on this date.  I have reviewed the PA, NP and or Medical/PA student and/or Resident documentation and agree with the history, physical exam and plan of care except if noted otherwise.  Patient presents for two-level lumbar instrumented fusion 2 components 5 1 is producing stenosis and a component of sciatica neurogenic claudication he also has a fair amount of a focal complaint and a primary complaint of mechanically orientated back pain based upon significant degenerative disc disease at 5 1 as well as a herniated disc and degenerative disc disease at 4 5 recommending instrumented spinal fusion to address his low back pain laminectomy and instrumented/interbody fusion to readdress the anterior column/osteotomy as well as increased foraminal dimensions.  Patient is aware of incomplete resolution of signs and symptoms persistent back pain adjacent segment disease revision surgery etc. Attending statement:  I have personally seen this patient, and formed a face to face diagnostic evaluation on this patient on this date.  I have reviewed the PA, NP and or Medical/PA student and/or Resident documentation and agree with the history, physical exam and plan of care except if noted otherwise.  Patient presents for two-level lumbar instrumented fusion 2 components 5 1 is producing stenosis and a component of sciatica neurogenic claudication he also has a fair amount of a focal complaint and a primary complaint of mechanically orientated back pain based upon significant degenerative disc disease at 5 1 as well as a herniated disc and degenerative disc disease at 4 5 recommending instrumented spinal fusion to address his low back pain laminectomy and instrumented/interbody fusion to readdress the anterior column/osteotomy as well as increased foraminal dimensions.  Patient is aware of incomplete resolution of signs and symptoms persistent back pain adjacent segment disease revision surgery etc. Discussed smoking and the assoc. risks of wound comp., non union, revision surgery and overall increased complication rates including persistent pain

## 2023-12-20 NOTE — H&P PST ADULT - NSANTHOSAYNRD_GEN_A_CORE
No. LETTY screening performed.  STOP BANG Legend: 0-2 = LOW Risk; 3-4 = INTERMEDIATE Risk; 5-8 = HIGH Risk

## 2023-12-20 NOTE — H&P PST ADULT - NSICDXFAMILYHX_GEN_ALL_CORE_FT
FAMILY HISTORY:  Mother  Still living? Unknown  FH: breast cancer, Age at diagnosis: Age Unknown    Sibling  Still living? Unknown  FH: multiple sclerosis, Age at diagnosis: Age Unknown    Grandparent  Still living? Unknown  FH: breast cancer, Age at diagnosis: Age Unknown    Aunt  Still living? Unknown  FH: breast cancer, Age at diagnosis: Age Unknown

## 2023-12-21 LAB
MRSA PCR RESULT.: SIGNIFICANT CHANGE UP
MRSA PCR RESULT.: SIGNIFICANT CHANGE UP
S AUREUS DNA NOSE QL NAA+PROBE: SIGNIFICANT CHANGE UP
S AUREUS DNA NOSE QL NAA+PROBE: SIGNIFICANT CHANGE UP

## 2023-12-26 LAB
ANABASINE UR-MCNC: <2 NG/ML — SIGNIFICANT CHANGE UP
ANABASINE UR-MCNC: <2 NG/ML — SIGNIFICANT CHANGE UP
COTININE UR-MCNC: 581 NG/ML — HIGH
COTININE UR-MCNC: 581 NG/ML — HIGH
NICOTINE UR-MCNC: 81 NG/ML — HIGH
NICOTINE UR-MCNC: 81 NG/ML — HIGH
NORNICOTINE UR-MCNC: 6.4 NG/ML — HIGH
NORNICOTINE UR-MCNC: 6.4 NG/ML — HIGH

## 2023-12-27 RX ORDER — APREPITANT 80 MG/1
40 CAPSULE ORAL ONCE
Refills: 0 | Status: DISCONTINUED | OUTPATIENT
Start: 2024-01-08 | End: 2024-01-11

## 2024-01-05 RX ORDER — POVIDONE-IODINE 5 %
1 AEROSOL (ML) TOPICAL ONCE
Refills: 0 | Status: COMPLETED | OUTPATIENT
Start: 2024-01-08 | End: 2024-01-08

## 2024-01-05 NOTE — PATIENT PROFILE ADULT - FALL HARM RISK - HARM RISK INTERVENTIONS
Assistance with ambulation/Assistance OOB with selected safe patient handling equipment/Communicate Risk of Fall with Harm to all staff/Monitor gait and stability/Reinforce activity limits and safety measures with patient and family/Sit up slowly, dangle for a short time, stand at bedside before walking/Tailored Fall Risk Interventions/Use of alarms - bed, chair and/or voice tab/Visual Cue: Yellow wristband and red socks/Bed in lowest position, wheels locked, appropriate side rails in place/Call bell, personal items and telephone in reach/Instruct patient to call for assistance before getting out of bed or chair/Non-slip footwear when patient is out of bed/[place] to call system/Physically safe environment - no spills, clutter or unnecessary equipment/Purposeful Proactive Rounding/Room/bathroom lighting operational, light cord in reach Assistance with ambulation/Assistance OOB with selected safe patient handling equipment/Communicate Risk of Fall with Harm to all staff/Monitor gait and stability/Reinforce activity limits and safety measures with patient and family/Sit up slowly, dangle for a short time, stand at bedside before walking/Tailored Fall Risk Interventions/Use of alarms - bed, chair and/or voice tab/Visual Cue: Yellow wristband and red socks/Bed in lowest position, wheels locked, appropriate side rails in place/Call bell, personal items and telephone in reach/Instruct patient to call for assistance before getting out of bed or chair/Non-slip footwear when patient is out of bed/[place] to call system/Physically safe environment - no spills, clutter or unnecessary equipment/Purposeful Proactive Rounding/Room/bathroom lighting operational, light cord in reach

## 2024-01-05 NOTE — PATIENT PROFILE ADULT - FUNCTIONAL ASSESSMENT - BASIC MOBILITY 6.
3-calculated by average/Not able to assess (calculate score using [facility] averaging method)  3-calculated by average/Not able to assess (calculate score using [facility] averaging method)

## 2024-01-07 ENCOUNTER — TRANSCRIPTION ENCOUNTER (OUTPATIENT)
Age: 59
End: 2024-01-07

## 2024-01-08 ENCOUNTER — APPOINTMENT (OUTPATIENT)
Dept: ORTHOPEDIC SURGERY | Facility: HOSPITAL | Age: 59
End: 2024-01-08

## 2024-01-08 ENCOUNTER — INPATIENT (INPATIENT)
Facility: HOSPITAL | Age: 59
LOS: 2 days | Discharge: HOME | DRG: 453 | End: 2024-01-11
Attending: ORTHOPAEDIC SURGERY | Admitting: ORTHOPAEDIC SURGERY
Payer: COMMERCIAL

## 2024-01-08 VITALS
TEMPERATURE: 98 F | HEIGHT: 67 IN | SYSTOLIC BLOOD PRESSURE: 113 MMHG | DIASTOLIC BLOOD PRESSURE: 63 MMHG | HEART RATE: 68 BPM | OXYGEN SATURATION: 99 % | RESPIRATION RATE: 16 BRPM | WEIGHT: 104.94 LBS

## 2024-01-08 DIAGNOSIS — N70.91 SALPINGITIS, UNSPECIFIED: Chronic | ICD-10-CM

## 2024-01-08 DIAGNOSIS — M48.07 SPINAL STENOSIS, LUMBOSACRAL REGION: ICD-10-CM

## 2024-01-08 DIAGNOSIS — Z98.890 OTHER SPECIFIED POSTPROCEDURAL STATES: Chronic | ICD-10-CM

## 2024-01-08 DIAGNOSIS — M47.816 SPONDYLOSIS WITHOUT MYELOPATHY OR RADICULOPATHY, LUMBAR REGION: ICD-10-CM

## 2024-01-08 LAB
ABO RH CONFIRMATION: SIGNIFICANT CHANGE UP
ABO RH CONFIRMATION: SIGNIFICANT CHANGE UP

## 2024-01-08 PROCEDURE — 22634 ARTHRD CMBN 1NTRSPC EA ADDL: CPT | Mod: 80

## 2024-01-08 PROCEDURE — 22216 INCIS ADDL SPINE SEGMENT: CPT

## 2024-01-08 PROCEDURE — 22216 INCIS ADDL SPINE SEGMENT: CPT | Mod: 80

## 2024-01-08 PROCEDURE — 22853 INSJ BIOMECHANICAL DEVICE: CPT | Mod: 80

## 2024-01-08 PROCEDURE — 22634 ARTHRD CMBN 1NTRSPC EA ADDL: CPT

## 2024-01-08 PROCEDURE — 22214 INCIS 1 VERTEBRAL SEG LUMBAR: CPT

## 2024-01-08 PROCEDURE — 22633 ARTHRD CMBN 1NTRSPC LUMBAR: CPT

## 2024-01-08 PROCEDURE — 99222 1ST HOSP IP/OBS MODERATE 55: CPT

## 2024-01-08 PROCEDURE — 22214 INCIS 1 VERTEBRAL SEG LUMBAR: CPT | Mod: 80

## 2024-01-08 PROCEDURE — 22853 INSJ BIOMECHANICAL DEVICE: CPT

## 2024-01-08 PROCEDURE — 22842 INSERT SPINE FIXATION DEVICE: CPT | Mod: 80

## 2024-01-08 PROCEDURE — 22842 INSERT SPINE FIXATION DEVICE: CPT

## 2024-01-08 PROCEDURE — 22633 ARTHRD CMBN 1NTRSPC LUMBAR: CPT | Mod: 80

## 2024-01-08 DEVICE — SPACER PROLIFT EXPAND POST 15 DEG 10X28X8-13MM: Type: IMPLANTABLE DEVICE | Status: FUNCTIONAL

## 2024-01-08 DEVICE — SCREW SERRATO 6.5X45MM: Type: IMPLANTABLE DEVICE | Status: FUNCTIONAL

## 2024-01-08 DEVICE — GRAFT VITOSIS BIMODAL 10CC: Type: IMPLANTABLE DEVICE | Status: FUNCTIONAL

## 2024-01-08 DEVICE — ROD 70MM: Type: IMPLANTABLE DEVICE | Status: FUNCTIONAL

## 2024-01-08 DEVICE — SURGIFOAM PAD 8CM X 12.5CM X 10MM (100): Type: IMPLANTABLE DEVICE | Status: FUNCTIONAL

## 2024-01-08 DEVICE — GRAFT BONE VITOSS BIMODAL 5CC: Type: IMPLANTABLE DEVICE | Status: FUNCTIONAL

## 2024-01-08 DEVICE — FLOSEAL WITH RECOTHROM THROMBIN 10ML: Type: IMPLANTABLE DEVICE | Status: FUNCTIONAL

## 2024-01-08 DEVICE — IMPLANTABLE DEVICE: Type: IMPLANTABLE DEVICE | Status: FUNCTIONAL

## 2024-01-08 DEVICE — DURASEAL: Type: IMPLANTABLE DEVICE | Status: FUNCTIONAL

## 2024-01-08 DEVICE — SCREW BLOCKER BONE XIA: Type: IMPLANTABLE DEVICE | Status: FUNCTIONAL

## 2024-01-08 DEVICE — STRYKER LITE BIO DELIVERY SYSTEM WITH CANNULA: Type: IMPLANTABLE DEVICE | Status: FUNCTIONAL

## 2024-01-08 DEVICE — SPACER PROLIFT EXPAND POST 15 DEG 10X28X16MM: Type: IMPLANTABLE DEVICE | Status: FUNCTIONAL

## 2024-01-08 DEVICE — CELLERATE SURGICAL POWDER RX 5GM: Type: IMPLANTABLE DEVICE | Status: FUNCTIONAL

## 2024-01-08 RX ORDER — CEFAZOLIN SODIUM 1 G
2000 VIAL (EA) INJECTION
Refills: 0 | Status: COMPLETED | OUTPATIENT
Start: 2024-01-08 | End: 2024-01-09

## 2024-01-08 RX ORDER — OXYCODONE HYDROCHLORIDE 5 MG/1
5 TABLET ORAL
Refills: 0 | Status: DISCONTINUED | OUTPATIENT
Start: 2024-01-08 | End: 2024-01-11

## 2024-01-08 RX ORDER — PANTOPRAZOLE SODIUM 20 MG/1
40 TABLET, DELAYED RELEASE ORAL
Refills: 0 | Status: DISCONTINUED | OUTPATIENT
Start: 2024-01-08 | End: 2024-01-11

## 2024-01-08 RX ORDER — CEFAZOLIN SODIUM 1 G
2000 VIAL (EA) INJECTION ONCE
Refills: 0 | Status: DISCONTINUED | OUTPATIENT
Start: 2024-01-08 | End: 2024-01-08

## 2024-01-08 RX ORDER — CELECOXIB 200 MG/1
200 CAPSULE ORAL EVERY 12 HOURS
Refills: 0 | Status: DISCONTINUED | OUTPATIENT
Start: 2024-01-08 | End: 2024-01-11

## 2024-01-08 RX ORDER — ASPIRIN/CALCIUM CARB/MAGNESIUM 324 MG
325 TABLET ORAL DAILY
Refills: 0 | Status: DISCONTINUED | OUTPATIENT
Start: 2024-01-09 | End: 2024-01-11

## 2024-01-08 RX ORDER — ACETAMINOPHEN 500 MG
975 TABLET ORAL EVERY 8 HOURS
Refills: 0 | Status: DISCONTINUED | OUTPATIENT
Start: 2024-01-08 | End: 2024-01-11

## 2024-01-08 RX ORDER — HYDROMORPHONE HYDROCHLORIDE 2 MG/ML
0.5 INJECTION INTRAMUSCULAR; INTRAVENOUS; SUBCUTANEOUS
Refills: 0 | Status: DISCONTINUED | OUTPATIENT
Start: 2024-01-08 | End: 2024-01-08

## 2024-01-08 RX ORDER — ONDANSETRON 8 MG/1
4 TABLET, FILM COATED ORAL EVERY 6 HOURS
Refills: 0 | Status: DISCONTINUED | OUTPATIENT
Start: 2024-01-08 | End: 2024-01-08

## 2024-01-08 RX ORDER — SODIUM CHLORIDE 9 MG/ML
1000 INJECTION, SOLUTION INTRAVENOUS
Refills: 0 | Status: DISCONTINUED | OUTPATIENT
Start: 2024-01-08 | End: 2024-01-11

## 2024-01-08 RX ORDER — OXYCODONE HYDROCHLORIDE 5 MG/1
10 TABLET ORAL
Refills: 0 | Status: DISCONTINUED | OUTPATIENT
Start: 2024-01-08 | End: 2024-01-11

## 2024-01-08 RX ORDER — SODIUM CHLORIDE 9 MG/ML
3 INJECTION INTRAMUSCULAR; INTRAVENOUS; SUBCUTANEOUS EVERY 8 HOURS
Refills: 0 | Status: DISCONTINUED | OUTPATIENT
Start: 2024-01-08 | End: 2024-01-08

## 2024-01-08 RX ORDER — FENTANYL CITRATE 50 UG/ML
50 INJECTION INTRAVENOUS
Refills: 0 | Status: DISCONTINUED | OUTPATIENT
Start: 2024-01-08 | End: 2024-01-08

## 2024-01-08 RX ORDER — ACETAMINOPHEN 500 MG
975 TABLET ORAL ONCE
Refills: 0 | Status: COMPLETED | OUTPATIENT
Start: 2024-01-08 | End: 2024-01-08

## 2024-01-08 RX ORDER — HYDROMORPHONE HYDROCHLORIDE 2 MG/ML
0.5 INJECTION INTRAMUSCULAR; INTRAVENOUS; SUBCUTANEOUS EVERY 6 HOURS
Refills: 0 | Status: DISCONTINUED | OUTPATIENT
Start: 2024-01-08 | End: 2024-01-08

## 2024-01-08 RX ORDER — CELECOXIB 200 MG/1
200 CAPSULE ORAL ONCE
Refills: 0 | Status: COMPLETED | OUTPATIENT
Start: 2024-01-08 | End: 2024-01-08

## 2024-01-08 RX ORDER — ONDANSETRON 8 MG/1
4 TABLET, FILM COATED ORAL EVERY 6 HOURS
Refills: 0 | Status: DISCONTINUED | OUTPATIENT
Start: 2024-01-08 | End: 2024-01-11

## 2024-01-08 RX ORDER — SENNA PLUS 8.6 MG/1
2 TABLET ORAL AT BEDTIME
Refills: 0 | Status: DISCONTINUED | OUTPATIENT
Start: 2024-01-08 | End: 2024-01-11

## 2024-01-08 RX ORDER — METHOCARBAMOL 500 MG/1
750 TABLET, FILM COATED ORAL EVERY 8 HOURS
Refills: 0 | Status: DISCONTINUED | OUTPATIENT
Start: 2024-01-08 | End: 2024-01-11

## 2024-01-08 RX ORDER — MAGNESIUM HYDROXIDE 400 MG/1
30 TABLET, CHEWABLE ORAL EVERY 12 HOURS
Refills: 0 | Status: DISCONTINUED | OUTPATIENT
Start: 2024-01-08 | End: 2024-01-11

## 2024-01-08 RX ORDER — ALBUTEROL 90 UG/1
1 AEROSOL, METERED ORAL EVERY 4 HOURS
Refills: 0 | Status: DISCONTINUED | OUTPATIENT
Start: 2024-01-08 | End: 2024-01-11

## 2024-01-08 RX ORDER — ONDANSETRON 8 MG/1
4 TABLET, FILM COATED ORAL ONCE
Refills: 0 | Status: DISCONTINUED | OUTPATIENT
Start: 2024-01-08 | End: 2024-01-08

## 2024-01-08 RX ORDER — LANOLIN ALCOHOL/MO/W.PET/CERES
3 CREAM (GRAM) TOPICAL AT BEDTIME
Refills: 0 | Status: DISCONTINUED | OUTPATIENT
Start: 2024-01-08 | End: 2024-01-11

## 2024-01-08 RX ORDER — SODIUM CHLORIDE 9 MG/ML
1000 INJECTION, SOLUTION INTRAVENOUS
Refills: 0 | Status: DISCONTINUED | OUTPATIENT
Start: 2024-01-08 | End: 2024-01-08

## 2024-01-08 RX ADMIN — FENTANYL CITRATE 50 MICROGRAM(S): 50 INJECTION INTRAVENOUS at 11:52

## 2024-01-08 RX ADMIN — FENTANYL CITRATE 50 MICROGRAM(S): 50 INJECTION INTRAVENOUS at 12:52

## 2024-01-08 RX ADMIN — Medication 975 MILLIGRAM(S): at 21:54

## 2024-01-08 RX ADMIN — Medication 2000 MILLIGRAM(S): at 21:53

## 2024-01-08 RX ADMIN — FENTANYL CITRATE 50 MICROGRAM(S): 50 INJECTION INTRAVENOUS at 12:22

## 2024-01-08 RX ADMIN — Medication 975 MILLIGRAM(S): at 22:54

## 2024-01-08 RX ADMIN — Medication 1 APPLICATION(S): at 07:25

## 2024-01-08 RX ADMIN — CELECOXIB 200 MILLIGRAM(S): 200 CAPSULE ORAL at 18:16

## 2024-01-08 RX ADMIN — SENNA PLUS 2 TABLET(S): 8.6 TABLET ORAL at 21:54

## 2024-01-08 RX ADMIN — CELECOXIB 200 MILLIGRAM(S): 200 CAPSULE ORAL at 07:10

## 2024-01-08 RX ADMIN — Medication 975 MILLIGRAM(S): at 07:09

## 2024-01-08 RX ADMIN — SODIUM CHLORIDE 75 MILLILITER(S): 9 INJECTION, SOLUTION INTRAVENOUS at 18:17

## 2024-01-08 RX ADMIN — METHOCARBAMOL 750 MILLIGRAM(S): 500 TABLET, FILM COATED ORAL at 21:54

## 2024-01-08 RX ADMIN — FENTANYL CITRATE 50 MICROGRAM(S): 50 INJECTION INTRAVENOUS at 12:07

## 2024-01-08 RX ADMIN — OXYCODONE HYDROCHLORIDE 10 MILLIGRAM(S): 5 TABLET ORAL at 18:17

## 2024-01-08 RX ADMIN — OXYCODONE HYDROCHLORIDE 10 MILLIGRAM(S): 5 TABLET ORAL at 13:53

## 2024-01-08 RX ADMIN — SODIUM CHLORIDE 75 MILLILITER(S): 9 INJECTION, SOLUTION INTRAVENOUS at 21:55

## 2024-01-08 RX ADMIN — OXYCODONE HYDROCHLORIDE 10 MILLIGRAM(S): 5 TABLET ORAL at 14:22

## 2024-01-08 NOTE — BRIEF OPERATIVE NOTE - NSICDXBRIEFPREOP_GEN_ALL_CORE_FT
PRE-OP DIAGNOSIS:  Spondylolisthesis 08-Jan-2024 08:36:22  Ernestina Condon  Lumbosacral stenosis with neurogenic claudication 08-Jan-2024 08:51:35  Ernestina Condon  Spondylolisthesis 08-Jan-2024 08:51:52  Ernestina Condon

## 2024-01-08 NOTE — CONSULT NOTE ADULT - SUBJECTIVE AND OBJECTIVE BOX
PMD :  Cardio :     Patient is a 58y old  Female who presents with a chief complaint of severe weakness and leg pain with walking, back pain ,   s/p Lumbar fusion . Seen and examined on PACU. Tolerated procedure well .     CC: as above       HPI:  58-year-old female pmhx HLD/ osteoporosis not yet on medication/ chronic daily smoker >30year pack hx/Cervical disc disease/ Chronic low back pain with longstanding history of 4-5 and 5-1 lumbar degenerative disc disease and chronic low back pain, going on approximately 20 years. pain is described as 10/10 sharp/ stabbing, radiation to BLE, worsened by bending and worsened by lifting. Conservative mgmt includes NSAIDs, PT, injection therapy with no relief- she is exhausted multiple courses of physical therapy and injection therapy. As per DR. Rubin- longstanding history of low back pain, per patient she will suffer from incomplete resolution of back pain, failed physical therapy, failed injection therapy, recommended for L4-L5, L5-S1 instrumented fusion, interbody fusion laminectomy and posterior lateral fusion, now scheduled 01/08/2023. Reports gait is limited 2/2 pain, and also a/w neuropathy ernei/ hands and feet.       PAST MEDICAL & SURGICAL HISTORY:  Cervical spine pain      Spondylosis - lumbar       Current every day smoker      H/O osteoporosis      HLD (hyperlipidemia)      H/O cervical biopsy      Abscess of right fallopian tube          Social History:  Tobacco - smoker 4-5 cigarettes/ day for 45 yrs    ETOH - denies   Illicit drug abuse - denies    FAMILY HISTORY:  FH: breast cancer (Mother, Grandparent, Aunt)    FH: multiple sclerosis (Sibling)        Allergies    No Known Allergies    Intolerances        HOME MEDICATIONS :     none       REVIEW OF SYSTEMS:    As above , all other systems are reviewed and are negative .     MEDICATIONS  (STANDING):  acetaminophen     Tablet .. 975 milliGRAM(s) Oral every 8 hours  aprepitant 40 milliGRAM(s) Oral once  celecoxib 200 milliGRAM(s) Oral every 12 hours  lactated ringers. 1000 milliLiter(s) (75 mL/Hr) IV Continuous <Continuous>  methocarbamol 750 milliGRAM(s) Oral every 8 hours  pantoprazole    Tablet 40 milliGRAM(s) Oral before breakfast  senna 2 Tablet(s) Oral at bedtime    MEDICATIONS  (PRN):  albuterol    90 MICROgram(s) HFA Inhaler 1 Puff(s) Inhalation every 4 hours PRN Shortness of Breath and/or Wheezing  aluminum hydroxide/magnesium hydroxide/simethicone Suspension 30 milliLiter(s) Oral every 12 hours PRN Indigestion  HYDROmorphone  Injectable 0.5 milliGRAM(s) IV Push every 6 hours PRN BREATKNROUGH PAIN NOT CONTROLLED WITH CURRENT MEDS  magnesium hydroxide Suspension 30 milliLiter(s) Oral every 12 hours PRN Constipation  melatonin 3 milliGRAM(s) Oral at bedtime PRN Insomnia  ondansetron Injectable 4 milliGRAM(s) IV Push every 6 hours PRN Nausea and/or Vomiting  oxyCODONE    IR 5 milliGRAM(s) Oral every 3 hours PRN Moderate Pain (4 - 6)  oxyCODONE    IR 10 milliGRAM(s) Oral every 3 hours PRN Severe Pain (7 - 10)      Vital Signs Last 24 Hrs  T(C): 36.6 (08 Jan 2024 18:02), Max: 36.9 (08 Jan 2024 12:22)  T(F): 97.9 (08 Jan 2024 18:02), Max: 98.4 (08 Jan 2024 12:22)  HR: 64 (08 Jan 2024 18:02) (60 - 120)  BP: 93/61 (08 Jan 2024 18:02) (82/45 - 113/63)  BP(mean): 89 (08 Jan 2024 17:30) (55 - 95)  RR: 17 (08 Jan 2024 18:02) (12 - 20)  SpO2: 96% (08 Jan 2024 18:02) (95% - 100%)    Parameters below as of 08 Jan 2024 18:02  Patient On (Oxygen Delivery Method): room air        PHYSICAL EXAM:    GENERAL: NAD, well-groomed, well-developed  HEAD:  Atraumatic, Normocephalic  EYES: EOMI, PERRLA, conjunctiva and sclera clear  NECK: Supple, No JVD, Normal thyroid  NERVOUS SYSTEM:  Alert & Oriented X3, no focal deficit   CHEST/LUNG: CTA  b/l,  no rales, rhonchi, wheezing, or rubs  HEART: Regular rate and rhythm; No murmurs, rubs, or gallops  ABDOMEN: Soft, Nontender, Nondistended; Bowel sounds present  EXTREMITIES:  2+ Peripheral Pulses, No clubbing, cyanosis, or edema ,   LYMPH: No lymphadenopathy noted  SKIN: No rashes or lesions  Low back with dry dressing + ,WILFREDO+      PMD :  Cardio :     Patient is a 58y old  Female who presents with a chief complaint of severe weakness and leg pain with walking, back pain ,   s/p Lumbar fusion . Seen and examined on PACU. Tolerated procedure well .     CC: as above       HPI:  58-year-old female pmhx HLD/ osteoporosis not yet on medication/ chronic daily smoker >30year pack hx/Cervical disc disease/ Chronic low back pain with longstanding history of 4-5 and 5-1 lumbar degenerative disc disease and chronic low back pain, going on approximately 20 years. pain is described as 10/10 sharp/ stabbing, radiation to BLE, worsened by bending and worsened by lifting. Conservative mgmt includes NSAIDs, PT, injection therapy with no relief- she is exhausted multiple courses of physical therapy and injection therapy. As per DR. Rubin- longstanding history of low back pain, per patient she will suffer from incomplete resolution of back pain, failed physical therapy, failed injection therapy, recommended for L4-L5, L5-S1 instrumented fusion, interbody fusion laminectomy and posterior lateral fusion, now scheduled 01/08/2023. Reports gait is limited 2/2 pain, and also a/w neuropathy ernie/ hands and feet.       PAST MEDICAL & SURGICAL HISTORY:  Cervical spine pain      Spondylosis - lumbar       Current every day smoker      H/O osteoporosis      HLD (hyperlipidemia)      H/O cervical biopsy      Abscess of right fallopian tube          Social History:  Tobacco - smoker 4-5 cigarettes/ day for 45 yrs    ETOH - denies   Illicit drug abuse - denies    FAMILY HISTORY:  FH: breast cancer (Mother, Grandparent, Aunt)    FH: multiple sclerosis (Sibling)        Allergies    No Known Allergies    Intolerances        HOME MEDICATIONS :     none       REVIEW OF SYSTEMS:    As above , all other systems are reviewed and are negative .     MEDICATIONS  (STANDING):  acetaminophen     Tablet .. 975 milliGRAM(s) Oral every 8 hours  aprepitant 40 milliGRAM(s) Oral once  celecoxib 200 milliGRAM(s) Oral every 12 hours  lactated ringers. 1000 milliLiter(s) (75 mL/Hr) IV Continuous <Continuous>  methocarbamol 750 milliGRAM(s) Oral every 8 hours  pantoprazole    Tablet 40 milliGRAM(s) Oral before breakfast  senna 2 Tablet(s) Oral at bedtime    MEDICATIONS  (PRN):  albuterol    90 MICROgram(s) HFA Inhaler 1 Puff(s) Inhalation every 4 hours PRN Shortness of Breath and/or Wheezing  aluminum hydroxide/magnesium hydroxide/simethicone Suspension 30 milliLiter(s) Oral every 12 hours PRN Indigestion  HYDROmorphone  Injectable 0.5 milliGRAM(s) IV Push every 6 hours PRN BREATKNROUGH PAIN NOT CONTROLLED WITH CURRENT MEDS  magnesium hydroxide Suspension 30 milliLiter(s) Oral every 12 hours PRN Constipation  melatonin 3 milliGRAM(s) Oral at bedtime PRN Insomnia  ondansetron Injectable 4 milliGRAM(s) IV Push every 6 hours PRN Nausea and/or Vomiting  oxyCODONE    IR 5 milliGRAM(s) Oral every 3 hours PRN Moderate Pain (4 - 6)  oxyCODONE    IR 10 milliGRAM(s) Oral every 3 hours PRN Severe Pain (7 - 10)      Vital Signs Last 24 Hrs  T(C): 36.6 (08 Jan 2024 18:02), Max: 36.9 (08 Jan 2024 12:22)  T(F): 97.9 (08 Jan 2024 18:02), Max: 98.4 (08 Jan 2024 12:22)  HR: 64 (08 Jan 2024 18:02) (60 - 120)  BP: 93/61 (08 Jan 2024 18:02) (82/45 - 113/63)  BP(mean): 89 (08 Jan 2024 17:30) (55 - 95)  RR: 17 (08 Jan 2024 18:02) (12 - 20)  SpO2: 96% (08 Jan 2024 18:02) (95% - 100%)    Parameters below as of 08 Jan 2024 18:02  Patient On (Oxygen Delivery Method): room air        PHYSICAL EXAM:    GENERAL: NAD, well-groomed, well-developed  HEAD:  Atraumatic, Normocephalic  EYES: EOMI, PERRLA, conjunctiva and sclera clear  NECK: Supple, No JVD, Normal thyroid  NERVOUS SYSTEM:  Alert & Oriented X3, no focal deficit   CHEST/LUNG: CTA  b/l,  no rales, rhonchi, wheezing, or rubs  HEART: Regular rate and rhythm; No murmurs, rubs, or gallops  ABDOMEN: Soft, Nontender, Nondistended; Bowel sounds present  EXTREMITIES:  2+ Peripheral Pulses, No clubbing, cyanosis, or edema ,   LYMPH: No lymphadenopathy noted  SKIN: No rashes or lesions  Low back with dry dressing + ,WILFREDO+

## 2024-01-08 NOTE — PHYSICAL THERAPY INITIAL EVALUATION ADULT - CRITERIA FOR SKILLED THERAPEUTIC INTERVENTIONS
Home PT/impairments found/functional limitations in following categories/anticipated discharge recommendation

## 2024-01-08 NOTE — PHYSICAL THERAPY INITIAL EVALUATION ADULT - DIAGNOSIS, PT EVAL
Pt demonstrates functional limitations in transfers and ambulation due to decreased balance and weakness.

## 2024-01-08 NOTE — BRIEF OPERATIVE NOTE - OPERATION/FINDINGS
L4-S1 B/L TLIF with posterior osteotomy L4-5 and L5-S1, PLF with instrumentation L4-S1
L5-S1 severe spondylosis, Mod L4-L5 spondylosis

## 2024-01-08 NOTE — BRIEF OPERATIVE NOTE - COMMENTS
doimtila screws with life spine cages, Normothermia via bear ggsabine domitila screws with life spine cages, Normothermia via bear ggsabine

## 2024-01-08 NOTE — PHYSICAL THERAPY INITIAL EVALUATION ADULT - RANGE OF MOTION EXAMINATION, REHAB EVAL
trunk ROM limited due to pain and spinal precautions/bilateral upper extremity ROM was WNL (within normal limits)/bilateral lower extremity was ROM was WNL (within normal limits)

## 2024-01-08 NOTE — ASU PREOP CHECKLIST - WAS PATIENT ON BETA BLOCKER?
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 patient states she has been onable to tolerate oral diet well since the procedure for last ~3 month and has been on clear liquid diet. but for the last 2-3 weeks she cant tolerate anything by mouth. she gets pain in the oral cavity, nausea anorexia. patient report finishing chemo and radiation last week.    Syncope secondary to hypovalemia and electrolyte imbalances  - s/p fluid bolus  - IVF   - Telemetry monitoring  - TTE    Hypokalemia  - K 2.5  - Repleted  - Monitor BMP  - Continue telemetry monitoring     Decreased oral intake  - Due to oral thursh and radiation induced mucositis   - Nystatin oral swish and spit : oral thrush + radiation induced mucositis  - Clear liquid diet  - Speech and swallow evaluation noted  - Barium swallow today     Right buccal cancer  - Squamous cell carcinoma of gingiva s/p R composite resection, SND I-IV, s/p trach removed in January 2022, s/p mandibulectomy, neck dissection, L fibula flap on 1/13   - Right chest wall chemo port in place   - Completed chemo/radiation last week   - Outpatient follow up with Oncology and Surgery    HTN  - Lisinopril on hold  - Lopressor 12.5mg BID    DVT ppx  - Heparin SQ    Dispo: pending clinical course, PT consult No

## 2024-01-08 NOTE — BRIEF OPERATIVE NOTE - NSICDXBRIEFPOSTOP_GEN_ALL_CORE_FT
POST-OP DIAGNOSIS:  Lumbosacral stenosis with neurogenic claudication 08-Jan-2024 10:35:12  Moises Mariano  
POST-OP DIAGNOSIS:  Lumbosacral stenosis with neurogenic claudication 08-Jan-2024 10:35:12  Moises Mariano

## 2024-01-08 NOTE — PROGRESS NOTE ADULT - SUBJECTIVE AND OBJECTIVE BOX
ECHO [id number]    58yFemale  Spondylosis of lumbar region without myelopathy or radiculopathy    FH: breast cancer (Mother, Grandparent, Aunt)    FH: multiple sclerosis (Sibling)    Cervical spine pain    Spondylosis without myelopathy or radiculopathy, lumbar region    Current every day smoker    H/O osteoporosis    HLD (hyperlipidemia)    Spondylolisthesis    Lumbosacral stenosis with neurogenic claudication    Spondylolisthesis    Spondylosis without myelopathy or radiculopathy, lumbar region    Current every day smoker    Prophylactic measure    H/O cervical biopsy    Abscess of right fallopian tube    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      Back Pain controlled, lower extremity pain resolving    OBJECTIVE:   T(C): 36.5 (01-08-24 @ 07:00), Max: 36.5 (01-08-24 @ 07:00)  HR: 68 (01-08-24 @ 07:00) (68 - 68)  BP: 113/63 (01-08-24 @ 07:00) (113/63 - 113/63)  RR: 16 (01-08-24 @ 07:00) (16 - 16)  SpO2: 99% (01-08-24 @ 07:00) (99% - 99%)   Constitutional: Pleasant in no acute distress  Psych:A&Ox3  Abdominal: soft and supple non distended  Lymphatics: no pretibial pitting edema  Spine:          Dressing:  clean/dry/intact                           WILFREDO drain in place, patent               Sensation:            Upper extremity          grossly intact manually          Lower extremity           grossly intact manually                               Motor:          Lower extremity                        HF(L2)   KE(L3)    TA(L4)   EHL(L5)  GS(S1)                                                 R        5/5        5/5        5/5       5/5         5/5                                               L         5/5        5/5       5/5       5/5          5/5                                                        [x] warm well perfused; capillary refill <3 seconds                A/P : 58yFemale   S/P lumbar laminectomy and fusion as above  POD#0  -    Pain control- multimodal.  Avoid NSAIDS since they can deter fusion.  -    DVT ppx: [ x]SCDs[ x] Pharmacolgic   mg once daily x 28 days post op start tomorrow.   -    Periop abx:  Ancef [x ]   -    Check AM labs    -    Monitor Drain Output, can discontinue drain when output equal or less than 10 cc/hr/12 hr.  change dressing when drain pulled and as needed, honeycomb dressing.    -  Resume home meds as appropriate  -PT/OT WBAT, balance and gait  - LSO with OOB not ordered/not mandatory.  Ortho/PT team can reevaluate possible benefit for additional external support daily, in post op period.   -medical follow up- Hospitalist Service  - probable home 3-5 days  - history of smoking- patient was counselled by multiple providers and she refused quit smoking  consult prior to surgery on the higher statistical chances of pseudoarthrosis (failure of fusion resulting in chronic pain and possible need for revision surgery), decreased incisional healing and increased chance of post op complications including infection, incisional dehiscence, increased hospital stay, readmission.  Patient stated she understands and will quit smoking entirely, accepts the risks and wanted to proceed with surgery based on uncontrolled pain levels and inability to walk.  At this time benefit outweighs risk.  Albuterol MDI every 4 hours prn wheeze/shortness of breath.  If medicine team wishes, patient may have quit smoking aid.  ECHO [id number]    58yFemale  Spondylosis of lumbar region without myelopathy or radiculopathy    FH: breast cancer (Mother, Grandparent, Aunt)    FH: multiple sclerosis (Sibling)    Cervical spine pain    Spondylosis without myelopathy or radiculopathy, lumbar region    Current every day smoker    H/O osteoporosis    HLD (hyperlipidemia)    Spondylolisthesis    Lumbosacral stenosis with neurogenic claudication    Spondylolisthesis    Spondylosis without myelopathy or radiculopathy, lumbar region    Current every day smoker    Prophylactic measure    H/O cervical biopsy    Abscess of right fallopian tube    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      Back Pain controlled, lower extremity pain resolving    OBJECTIVE:   T(C): 36.5 (01-08-24 @ 07:00), Max: 36.5 (01-08-24 @ 07:00)  HR: 68 (01-08-24 @ 07:00) (68 - 68)  BP: 113/63 (01-08-24 @ 07:00) (113/63 - 113/63)  RR: 16 (01-08-24 @ 07:00) (16 - 16)  SpO2: 99% (01-08-24 @ 07:00) (99% - 99%)   Constitutional: Pleasant in no acute distress  Psych:A&Ox3  Abdominal: soft and supple non distended  Lymphatics: no pretibial pitting edema  Spine:          Dressing:  clean/dry/intact                           WILFREDO drain in place, patent               Sensation:            Upper extremity          grossly intact manually          Lower extremity           grossly intact manually                               Motor:          Lower extremity                        HF(L2)   KE(L3)    TA(L4)   EHL(L5)  GS(S1)                                                 R        5/5        5/5        5/5       5/5         5/5                                               L         5/5        5/5       5/5       5/5          5/5                                                        [x] warm well perfused; capillary refill <3 seconds                A/P : 58yFemale   S/P lumbar laminectomy and fusion as above  POD#0  -    Pain control- multimodal.  Avoid NSAIDS since they can deter fusion.  -    DVT ppx: [ x]SCDs[ x] Pharmacolgic   mg once daily x 28 days post op start tomorrow.   -    Periop abx:  Ancef [x ]   -    Check AM labs    -    Monitor Drain Output, can discontinue drain when output equal or less than 10 cc/hr/12 hr.  change dressing when drain pulled and as needed, honeycomb dressing.    -  Resume home meds as appropriate  -PT/OT WBAT, balance and gait  - LSO with OOB not ordered/not mandatory.  Ortho/PT team can reevaluate possible benefit for additional external support daily, in post op period.   -medical follow up- Hospitalist Service  - probable home 3-5 days  - history of smoking- patient was counselled by multiple providers and she refused quit smoking  consult prior to surgery on the higher statistical chances of pseudoarthrosis (failure of fusion resulting in chronic pain and possible need for revision surgery), decreased incisional healing and increased chance of post op complications including infection, incisional dehiscence, increased hospital stay, readmission.  Patient stated she understands and will quit smoking entirely, accepts the risks and wanted to proceed with surgery based on uncontrolled pain levels and inability to walk.  At this time benefit outweighs risk.  Albuterol MDI every 4 hours prn wheeze/shortness of breath.  If medicine team wishes, patient may have quit smoking aid.  ECHO [id number]    58yFemale  Spondylosis of lumbar region without myelopathy or radiculopathy    FH: breast cancer (Mother, Grandparent, Aunt)    FH: multiple sclerosis (Sibling)    Cervical spine pain    Spondylosis without myelopathy or radiculopathy, lumbar region    Current every day smoker    H/O osteoporosis    HLD (hyperlipidemia)    Spondylolisthesis    Lumbosacral stenosis with neurogenic claudication    Spondylolisthesis    Spondylosis without myelopathy or radiculopathy, lumbar region    Current every day smoker    Prophylactic measure    H/O cervical biopsy    Abscess of right fallopian tube    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      Back Pain controlled, lower extremity pain resolving    OBJECTIVE:   T(C): 36.5 (01-08-24 @ 07:00), Max: 36.5 (01-08-24 @ 07:00)  HR: 68 (01-08-24 @ 07:00) (68 - 68)  BP: 113/63 (01-08-24 @ 07:00) (113/63 - 113/63)  RR: 16 (01-08-24 @ 07:00) (16 - 16)  SpO2: 99% (01-08-24 @ 07:00) (99% - 99%)   Constitutional: Pleasant in no acute distress  Psych:A&Ox3  Abdominal: soft and supple non distended  Lymphatics: no pretibial pitting edema  Spine:          Dressing:  clean/dry/intact                           WILFREDO drain in place, patent               Sensation:            Upper extremity          grossly intact manually          Lower extremity           grossly intact manually                               Motor:          Lower extremity                        HF(L2)   KE(L3)    TA(L4)   EHL(L5)  GS(S1)                                                 R        5/5        5/5        5/5       5/5         5/5                                               L         5/5        5/5       5/5       5/5          5/5                                                        [x] warm well perfused; capillary refill <3 seconds                A/P : 58yFemale   S/P lumbar laminectomy and fusion as above  POD#0  -    Pain control- multimodal.  Avoid NSAIDS since they can deter fusion.  -    DVT ppx: [ x]SCDs[ x] Pharmacolgic   mg once daily x 28 days post op start tomorrow.   -    Periop abx:  Ancef [x ]   -    Check AM labs    -    Monitor Drain Output, can discontinue drain when output equal or less than 10 cc/hr/12 hr.  change dressing when drain pulled and as needed, honeycomb dressing.    -  Resume home meds as appropriate  -PT/OT WBAT, balance and gait  - LSO with OOB not ordered/not mandatory.  Ortho/PT team can reevaluate possible benefit for additional external support daily, in post op period.   -medical follow up- Hospitalist Service Dr Kay  - probable home 3-5 days  - history of smoking- patient was counselled by multiple providers and she refused quit smoking  consult prior to surgery on the higher statistical chances of pseudoarthrosis (failure of fusion resulting in chronic pain and possible need for revision surgery), decreased incisional healing and increased chance of post op complications including infection, incisional dehiscence, increased hospital stay, readmission.  Patient stated she understands and will quit smoking entirely, accepts the risks and wanted to proceed with surgery based on uncontrolled pain levels and inability to walk.  At this time benefit outweighs risk.  Albuterol MDI every 4 hours prn wheeze/shortness of breath.  If medicine team wishes, patient may have quit smoking aid.  ECHO [id number]    58yFemale  Spondylosis of lumbar region without myelopathy or radiculopathy    FH: breast cancer (Mother, Grandparent, Aunt)    FH: multiple sclerosis (Sibling)    Cervical spine pain    Spondylosis without myelopathy or radiculopathy, lumbar region    Current every day smoker    H/O osteoporosis    HLD (hyperlipidemia)    Spondylolisthesis    Lumbosacral stenosis with neurogenic claudication    Spondylolisthesis    Spondylosis without myelopathy or radiculopathy, lumbar region    Current every day smoker    Prophylactic measure    H/O cervical biopsy    Abscess of right fallopian tube    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      Back Pain controlled, lower extremity pain resolving    OBJECTIVE:   T(C): 36.5 (01-08-24 @ 07:00), Max: 36.5 (01-08-24 @ 07:00)  HR: 68 (01-08-24 @ 07:00) (68 - 68)  BP: 113/63 (01-08-24 @ 07:00) (113/63 - 113/63)  RR: 16 (01-08-24 @ 07:00) (16 - 16)  SpO2: 99% (01-08-24 @ 07:00) (99% - 99%)   Constitutional: Pleasant in no acute distress  Psych:A&Ox3  Abdominal: soft and supple non distended  Lymphatics: no pretibial pitting edema  Spine:          Dressing:  clean/dry/intact                           WILFREDO drain in place, patent               Sensation:            Upper extremity          grossly intact manually          Lower extremity           grossly intact manually                               Motor:          Lower extremity                        HF(L2)   KE(L3)    TA(L4)   EHL(L5)  GS(S1)                                                 R        5/5        5/5        5/5       5/5         5/5                                               L         5/5        5/5       5/5       5/5          5/5                                                        [x] warm well perfused; capillary refill <3 seconds                A/P : 58yFemale   S/P lumbar laminectomy and fusion as above  POD#0  -    Pain control- multimodal.  Avoid NSAIDS since they can deter fusion.  -    DVT ppx: [ x]SCDs[ x] Pharmacolgic   mg once daily x 28 days post op start tomorrow.   -    Periop abx:  Ancef [x ]   -    Check AM labs    -    Monitor Drain Output, can discontinue drain when output equal or less than 10 cc/hr/12 hr.  change dressing when drain pulled and as needed, honeycomb dressing.    -  Resume home meds as appropriate  -PT/OT WBAT, balance and gait  - LSO with OOB not ordered/not mandatory.  Ortho/PT team can reevaluate possible benefit for additional external support daily, in post op period.   -medical follow up- Hospitalist Service Dr Kay  - probable home 3-5 days  - history of smoking- patient was counselled by multiple providers and she refused quit smoking  consult prior to surgery on the higher statistical chances of pseudoarthrosis (failure of fusion resulting in chronic pain and possible need for revision surgery), decreased incisional healing and increased chance of post op complications including infection, incisional dehiscence, increased hospital stay, readmission.  Patient stated she understands and will quit smoking entirely, accepts the risks and wanted to proceed with surgery based on uncontrolled pain levels and inability to walk.  At this time benefit outweighs risk.  Albuterol MDI every 4 hours prn wheeze/shortness of breath.  If medicine team wishes, patient may have quit smoking aid.  ECHO [id number]    58yFemale  Spondylosis of lumbar region without myelopathy or radiculopathy    FH: breast cancer (Mother, Grandparent, Aunt)    FH: multiple sclerosis (Sibling)    Cervical spine pain    Spondylosis without myelopathy or radiculopathy, lumbar region    Current every day smoker    H/O osteoporosis    HLD (hyperlipidemia)    Spondylolisthesis    Lumbosacral stenosis with neurogenic claudication    Spondylolisthesis    Spondylosis without myelopathy or radiculopathy, lumbar region    Current every day smoker    Prophylactic measure    H/O cervical biopsy    Abscess of right fallopian tube    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      Back Pain controlled, lower extremity pain resolving    OBJECTIVE:   T(C): 36.5 (01-08-24 @ 07:00), Max: 36.5 (01-08-24 @ 07:00)  HR: 68 (01-08-24 @ 07:00) (68 - 68)  BP: 113/63 (01-08-24 @ 07:00) (113/63 - 113/63)  RR: 16 (01-08-24 @ 07:00) (16 - 16)  SpO2: 99% (01-08-24 @ 07:00) (99% - 99%)   Constitutional: Pleasant in no acute distress  Psych:A&Ox3  Abdominal: soft and supple non distended  Lymphatics: no pretibial pitting edema  Spine:          Dressing:  clean/dry/intact                           WILFREDO drain in place, patent               Sensation:            Upper extremity          grossly intact manually          Lower extremity           grossly intact manually                               Motor:          Lower extremity                        HF(L2)   KE(L3)    TA(L4)   EHL(L5)  GS(S1)                                                 R        5/5        5/5        5/5       5/5         5/5                                               L         5/5        5/5       5/5       5/5          5/5                                                        [x] warm well perfused; capillary refill <3 seconds                A/P : 58yFemale   S/P lumbar laminectomy and fusion as above  POD#0  -    Pain control- multimodal.  Avoid NSAIDS since they can deter fusion.  -    DVT ppx: [ x]SCDs[ x] Pharmacolgic   mg once daily x 28 days post op start tomorrow.   -    Periop abx:  Ancef [x ]   -    Check AM labs    -    Monitor Drain Output, can discontinue drain when output equal or less than 10 cc/hr/12 hr.  change dressing when drain pulled and as needed, honeycomb dressing.    -  Resume home meds as appropriate  -PT/OT WBAT, balance and gait  - LSO with OOB not ordered/not mandatory.  Ortho/PT team can reevaluate possible benefit for additional external support daily, in post op period.   -medical follow up- Hospitalist Service Dr Kay  - probable home 3-5 days  - history of smoking- patient was counselled by multiple providers and she refused quit smoking  consult prior to surgery on the higher statistical chances of pseudoarthrosis (failure of fusion resulting in chronic pain and possible need for revision surgery), decreased incisional healing and increased chance of post op complications including infection, incisional dehiscence, increased hospital stay, readmission.  Patient stated she understands and will quit smoking entirely, accepts the risks and wanted to proceed with surgery based on uncontrolled pain levels and inability to walk.  At this time benefit outweighs risk.  Albuterol MDI every 4 hours prn wheeze/shortness of breath.  If medicine team wishes, patient may have quit smoking aid.   ECHO [id number]    58yFemale  Spondylosis of lumbar region without myelopathy or radiculopathy    FH: breast cancer (Mother, Grandparent, Aunt)    FH: multiple sclerosis (Sibling)    Cervical spine pain    Spondylosis without myelopathy or radiculopathy, lumbar region    Current every day smoker    H/O osteoporosis    HLD (hyperlipidemia)    Spondylolisthesis    Lumbosacral stenosis with neurogenic claudication    Spondylolisthesis    Spondylosis without myelopathy or radiculopathy, lumbar region    Current every day smoker    Prophylactic measure    H/O cervical biopsy    Abscess of right fallopian tube    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      Back Pain controlled, lower extremity pain resolving    OBJECTIVE:   T(C): 36.5 (01-08-24 @ 07:00), Max: 36.5 (01-08-24 @ 07:00)  HR: 68 (01-08-24 @ 07:00) (68 - 68)  BP: 113/63 (01-08-24 @ 07:00) (113/63 - 113/63)  RR: 16 (01-08-24 @ 07:00) (16 - 16)  SpO2: 99% (01-08-24 @ 07:00) (99% - 99%)   Constitutional: Pleasant in no acute distress  Psych:A&Ox3  Abdominal: soft and supple non distended  Lymphatics: no pretibial pitting edema  Spine:          Dressing:  clean/dry/intact                           WILFREDO drain in place, patent               Sensation:            Upper extremity          grossly intact manually          Lower extremity           grossly intact manually                               Motor:          Lower extremity                        HF(L2)   KE(L3)    TA(L4)   EHL(L5)  GS(S1)                                                 R        5/5        5/5        5/5       5/5         5/5                                               L         5/5        5/5       5/5       5/5          5/5                                                        [x] warm well perfused; capillary refill <3 seconds                A/P : 58yFemale   S/P lumbar laminectomy and fusion as above  POD#0  -    Pain control- multimodal.  Avoid NSAIDS since they can deter fusion.  -    DVT ppx: [ x]SCDs[ x] Pharmacolgic   mg once daily x 28 days post op start tomorrow.   -    Periop abx:  Ancef [x ]   -    Check AM labs    -    Monitor Drain Output, can discontinue drain when output equal or less than 10 cc/hr/12 hr.  change dressing when drain pulled and as needed, honeycomb dressing.    -  Resume home meds as appropriate  -PT/OT WBAT, balance and gait  - LSO with OOB not ordered/not mandatory.  Ortho/PT team can reevaluate possible benefit for additional external support daily, in post op period.   -medical follow up- Hospitalist Service Dr Kay  - probable home 3-5 days  - history of smoking- patient was counselled by multiple providers and she refused quit smoking  consult prior to surgery on the higher statistical chances of pseudoarthrosis (failure of fusion resulting in chronic pain and possible need for revision surgery), decreased incisional healing and increased chance of post op complications including infection, incisional dehiscence, increased hospital stay, readmission.  Patient stated she understands and will quit smoking entirely, accepts the risks and wanted to proceed with surgery based on uncontrolled pain levels and inability to walk.  At this time benefit outweighs risk.  Albuterol MDI every 4 hours prn wheeze/shortness of breath.  If medicine team wishes, patient may have quit smoking aid.

## 2024-01-08 NOTE — BRIEF OPERATIVE NOTE - NSICDXBRIEFPROCEDURE_GEN_ALL_CORE_FT
PROCEDURES:  Lumbar fusion using cage 08-Jan-2024 10:34:58  Moises Mariano  
PROCEDURES:  Lumbar fusion using cage 08-Jan-2024 10:34:58  Moises Mariano

## 2024-01-08 NOTE — PHYSICAL THERAPY INITIAL EVALUATION ADULT - ADDITIONAL COMMENTS
Pt reports she lives in a boarding house with approximately 15 other women. Pt reports she has 4 roommates.  Prior to surgery pt reports she was I for ADLs and ambulation without use of assistive devices, however, pt states she only walked short distances due to back pain.   DME: pt owns a RW and commode.

## 2024-01-08 NOTE — PHYSICAL THERAPY INITIAL EVALUATION ADULT - GENERAL OBSERVATIONS, REHAB EVAL
Pt received lying in left side lying in bed, bedrails x 3, CBWR, IV , , telemonitor, WILFREDO drain, and SCDs intact. Pt agreeable to PT evaluation.

## 2024-01-08 NOTE — PHYSICAL THERAPY INITIAL EVALUATION ADULT - CAPILLARY REFILL GENERAL
Patient believes she has another UTI - frequent feeling of having to go to bathroorm; pain. Patient has been able to get antibiotic from Dr Barnes in the past without being seen. Please call her back.   less than/equal to 3 secs

## 2024-01-08 NOTE — PHYSICAL THERAPY INITIAL EVALUATION ADULT - PERTINENT HX OF CURRENT PROBLEM, REHAB EVAL
PMH: HLD/osteoporosis, daily smoker, cervical disc disease, chronic low back pain, lumbar DDD  Dx: pt is now s/p Lumbar laminectomy and fusion L4-5, L5-S1

## 2024-01-08 NOTE — CONSULT NOTE ADULT - ASSESSMENT
58-year-old female pmhx HLD/ osteoporosis not yet on medication/ chronic daily smoker >30year pack hx/Cervical disc disease/ Chronic low back pain with longstanding history of 4-5 and 5-1 lumbar degenerative disc disease and chronic low back pain, going on approximately 20 years. pain is described as 10/10 sharp/ stabbing, radiation to BLE, worsened by bending and worsened by lifting. Conservative mgmt includes NSAIDs, PT, injection therapy with no relief- she is exhausted multiple courses of physical therapy and injection therapy. As per DR. Rubin- longstanding history of low back pain, per patient she will suffer from incomplete resolution of back pain, failed physical therapy, failed injection therapy, recommended for L4-L5, L5-S1 instrumented fusion, interbody fusion laminectomy and posterior lateral fusion, now scheduled 01/08/2023. Reports gait is limited 2/2 pain, and also a/w neuropathy ernie/ hands and feet.     1. Chronic low back pain /  Spondylolisthesis ,   s/p lumbar fusion ,   PT/OT/pain mgmt  DVT prophylaxis- as per ortho  Abx as per SCIP  Incentive spirometry  Prophylaxis of opioid  induced constipation.  Wound care/ WB status as per ortho   Drain and output as per ortho   ebl - 200 ml , asymptomatic     2. Hx of OP  - not on any medications yet -   will need f/u with PMD for further treatment     3. BP noted on lower side SBP in 90'S - as per patient that is at her base ,   continue maintenance ivf     4. DVT prophylaxis  - as per ortho protocol  Opioid induced constipation  prophylaxis - bowel regimen     Thank you for the courtesy of this consult .     Will sign off , please recall if any medical issues .

## 2024-01-09 ENCOUNTER — TRANSCRIPTION ENCOUNTER (OUTPATIENT)
Age: 59
End: 2024-01-09

## 2024-01-09 LAB
ANION GAP SERPL CALC-SCNC: 8 MMOL/L — SIGNIFICANT CHANGE UP (ref 5–17)
ANION GAP SERPL CALC-SCNC: 8 MMOL/L — SIGNIFICANT CHANGE UP (ref 5–17)
BASOPHILS # BLD AUTO: 0.01 K/UL — SIGNIFICANT CHANGE UP (ref 0–0.2)
BASOPHILS # BLD AUTO: 0.01 K/UL — SIGNIFICANT CHANGE UP (ref 0–0.2)
BASOPHILS NFR BLD AUTO: 0.1 % — SIGNIFICANT CHANGE UP (ref 0–2)
BASOPHILS NFR BLD AUTO: 0.1 % — SIGNIFICANT CHANGE UP (ref 0–2)
BUN SERPL-MCNC: 7.5 MG/DL — LOW (ref 8–20)
BUN SERPL-MCNC: 7.5 MG/DL — LOW (ref 8–20)
CALCIUM SERPL-MCNC: 8.5 MG/DL — SIGNIFICANT CHANGE UP (ref 8.4–10.5)
CALCIUM SERPL-MCNC: 8.5 MG/DL — SIGNIFICANT CHANGE UP (ref 8.4–10.5)
CHLORIDE SERPL-SCNC: 105 MMOL/L — SIGNIFICANT CHANGE UP (ref 96–108)
CHLORIDE SERPL-SCNC: 105 MMOL/L — SIGNIFICANT CHANGE UP (ref 96–108)
CO2 SERPL-SCNC: 27 MMOL/L — SIGNIFICANT CHANGE UP (ref 22–29)
CO2 SERPL-SCNC: 27 MMOL/L — SIGNIFICANT CHANGE UP (ref 22–29)
CREAT SERPL-MCNC: 0.69 MG/DL — SIGNIFICANT CHANGE UP (ref 0.5–1.3)
CREAT SERPL-MCNC: 0.69 MG/DL — SIGNIFICANT CHANGE UP (ref 0.5–1.3)
EGFR: 101 ML/MIN/1.73M2 — SIGNIFICANT CHANGE UP
EGFR: 101 ML/MIN/1.73M2 — SIGNIFICANT CHANGE UP
EOSINOPHIL # BLD AUTO: 0.01 K/UL — SIGNIFICANT CHANGE UP (ref 0–0.5)
EOSINOPHIL # BLD AUTO: 0.01 K/UL — SIGNIFICANT CHANGE UP (ref 0–0.5)
EOSINOPHIL NFR BLD AUTO: 0.1 % — SIGNIFICANT CHANGE UP (ref 0–6)
EOSINOPHIL NFR BLD AUTO: 0.1 % — SIGNIFICANT CHANGE UP (ref 0–6)
GLUCOSE SERPL-MCNC: 98 MG/DL — SIGNIFICANT CHANGE UP (ref 70–99)
GLUCOSE SERPL-MCNC: 98 MG/DL — SIGNIFICANT CHANGE UP (ref 70–99)
HCT VFR BLD CALC: 28.2 % — LOW (ref 34.5–45)
HCT VFR BLD CALC: 28.2 % — LOW (ref 34.5–45)
HGB BLD-MCNC: 9.1 G/DL — LOW (ref 11.5–15.5)
HGB BLD-MCNC: 9.1 G/DL — LOW (ref 11.5–15.5)
IMM GRANULOCYTES NFR BLD AUTO: 0.5 % — SIGNIFICANT CHANGE UP (ref 0–0.9)
IMM GRANULOCYTES NFR BLD AUTO: 0.5 % — SIGNIFICANT CHANGE UP (ref 0–0.9)
LYMPHOCYTES # BLD AUTO: 1.59 K/UL — SIGNIFICANT CHANGE UP (ref 1–3.3)
LYMPHOCYTES # BLD AUTO: 1.59 K/UL — SIGNIFICANT CHANGE UP (ref 1–3.3)
LYMPHOCYTES # BLD AUTO: 14.5 % — SIGNIFICANT CHANGE UP (ref 13–44)
LYMPHOCYTES # BLD AUTO: 14.5 % — SIGNIFICANT CHANGE UP (ref 13–44)
MCHC RBC-ENTMCNC: 31.3 PG — SIGNIFICANT CHANGE UP (ref 27–34)
MCHC RBC-ENTMCNC: 31.3 PG — SIGNIFICANT CHANGE UP (ref 27–34)
MCHC RBC-ENTMCNC: 32.3 GM/DL — SIGNIFICANT CHANGE UP (ref 32–36)
MCHC RBC-ENTMCNC: 32.3 GM/DL — SIGNIFICANT CHANGE UP (ref 32–36)
MCV RBC AUTO: 96.9 FL — SIGNIFICANT CHANGE UP (ref 80–100)
MCV RBC AUTO: 96.9 FL — SIGNIFICANT CHANGE UP (ref 80–100)
MONOCYTES # BLD AUTO: 0.74 K/UL — SIGNIFICANT CHANGE UP (ref 0–0.9)
MONOCYTES # BLD AUTO: 0.74 K/UL — SIGNIFICANT CHANGE UP (ref 0–0.9)
MONOCYTES NFR BLD AUTO: 6.8 % — SIGNIFICANT CHANGE UP (ref 2–14)
MONOCYTES NFR BLD AUTO: 6.8 % — SIGNIFICANT CHANGE UP (ref 2–14)
NEUTROPHILS # BLD AUTO: 8.54 K/UL — HIGH (ref 1.8–7.4)
NEUTROPHILS # BLD AUTO: 8.54 K/UL — HIGH (ref 1.8–7.4)
NEUTROPHILS NFR BLD AUTO: 78 % — HIGH (ref 43–77)
NEUTROPHILS NFR BLD AUTO: 78 % — HIGH (ref 43–77)
PLATELET # BLD AUTO: 244 K/UL — SIGNIFICANT CHANGE UP (ref 150–400)
PLATELET # BLD AUTO: 244 K/UL — SIGNIFICANT CHANGE UP (ref 150–400)
POTASSIUM SERPL-MCNC: 4.4 MMOL/L — SIGNIFICANT CHANGE UP (ref 3.5–5.3)
POTASSIUM SERPL-MCNC: 4.4 MMOL/L — SIGNIFICANT CHANGE UP (ref 3.5–5.3)
POTASSIUM SERPL-SCNC: 4.4 MMOL/L — SIGNIFICANT CHANGE UP (ref 3.5–5.3)
POTASSIUM SERPL-SCNC: 4.4 MMOL/L — SIGNIFICANT CHANGE UP (ref 3.5–5.3)
RBC # BLD: 2.91 M/UL — LOW (ref 3.8–5.2)
RBC # BLD: 2.91 M/UL — LOW (ref 3.8–5.2)
RBC # FLD: 13 % — SIGNIFICANT CHANGE UP (ref 10.3–14.5)
RBC # FLD: 13 % — SIGNIFICANT CHANGE UP (ref 10.3–14.5)
SODIUM SERPL-SCNC: 140 MMOL/L — SIGNIFICANT CHANGE UP (ref 135–145)
SODIUM SERPL-SCNC: 140 MMOL/L — SIGNIFICANT CHANGE UP (ref 135–145)
WBC # BLD: 10.95 K/UL — HIGH (ref 3.8–10.5)
WBC # BLD: 10.95 K/UL — HIGH (ref 3.8–10.5)
WBC # FLD AUTO: 10.95 K/UL — HIGH (ref 3.8–10.5)
WBC # FLD AUTO: 10.95 K/UL — HIGH (ref 3.8–10.5)

## 2024-01-09 PROCEDURE — 99233 SBSQ HOSP IP/OBS HIGH 50: CPT

## 2024-01-09 RX ADMIN — OXYCODONE HYDROCHLORIDE 10 MILLIGRAM(S): 5 TABLET ORAL at 01:59

## 2024-01-09 RX ADMIN — Medication 975 MILLIGRAM(S): at 14:29

## 2024-01-09 RX ADMIN — SENNA PLUS 2 TABLET(S): 8.6 TABLET ORAL at 21:11

## 2024-01-09 RX ADMIN — OXYCODONE HYDROCHLORIDE 10 MILLIGRAM(S): 5 TABLET ORAL at 22:17

## 2024-01-09 RX ADMIN — PANTOPRAZOLE SODIUM 40 MILLIGRAM(S): 20 TABLET, DELAYED RELEASE ORAL at 05:24

## 2024-01-09 RX ADMIN — METHOCARBAMOL 750 MILLIGRAM(S): 500 TABLET, FILM COATED ORAL at 14:29

## 2024-01-09 RX ADMIN — Medication 325 MILLIGRAM(S): at 14:28

## 2024-01-09 RX ADMIN — CELECOXIB 200 MILLIGRAM(S): 200 CAPSULE ORAL at 19:16

## 2024-01-09 RX ADMIN — Medication 2000 MILLIGRAM(S): at 05:24

## 2024-01-09 RX ADMIN — OXYCODONE HYDROCHLORIDE 10 MILLIGRAM(S): 5 TABLET ORAL at 21:17

## 2024-01-09 RX ADMIN — OXYCODONE HYDROCHLORIDE 10 MILLIGRAM(S): 5 TABLET ORAL at 09:57

## 2024-01-09 RX ADMIN — OXYCODONE HYDROCHLORIDE 10 MILLIGRAM(S): 5 TABLET ORAL at 10:57

## 2024-01-09 RX ADMIN — OXYCODONE HYDROCHLORIDE 10 MILLIGRAM(S): 5 TABLET ORAL at 16:51

## 2024-01-09 RX ADMIN — CELECOXIB 200 MILLIGRAM(S): 200 CAPSULE ORAL at 18:16

## 2024-01-09 RX ADMIN — Medication 975 MILLIGRAM(S): at 05:23

## 2024-01-09 RX ADMIN — CELECOXIB 200 MILLIGRAM(S): 200 CAPSULE ORAL at 05:54

## 2024-01-09 RX ADMIN — OXYCODONE HYDROCHLORIDE 10 MILLIGRAM(S): 5 TABLET ORAL at 15:51

## 2024-01-09 RX ADMIN — Medication 975 MILLIGRAM(S): at 05:53

## 2024-01-09 RX ADMIN — METHOCARBAMOL 750 MILLIGRAM(S): 500 TABLET, FILM COATED ORAL at 05:23

## 2024-01-09 RX ADMIN — CELECOXIB 200 MILLIGRAM(S): 200 CAPSULE ORAL at 05:24

## 2024-01-09 RX ADMIN — Medication 975 MILLIGRAM(S): at 15:29

## 2024-01-09 RX ADMIN — OXYCODONE HYDROCHLORIDE 10 MILLIGRAM(S): 5 TABLET ORAL at 00:59

## 2024-01-09 NOTE — DISCHARGE NOTE PROVIDER - NSDCCPCAREPLAN_GEN_ALL_CORE_FT
PRINCIPAL DISCHARGE DIAGNOSIS  Diagnosis: Spondylosis without myelopathy or radiculopathy, lumbar region  Assessment and Plan of Treatment:

## 2024-01-09 NOTE — PROGRESS NOTE ADULT - SUBJECTIVE AND OBJECTIVE BOX
Patient noted with bradycardic  episode on cardiac monitor , hr - 45 AT 6 AM .   Patient seen and examined . S/p lumbar fusion  , POD # 1. Pain well controlled , denies sob/ chest pain , slept well .   Denies n/v , voiding . Drain +     CC : low back chronic pain postop well controlled , bradycardia boted on monitor       MEDICATIONS  (STANDING):  acetaminophen     Tablet .. 975 milliGRAM(s) Oral every 8 hours  aprepitant 40 milliGRAM(s) Oral once  aspirin enteric coated 325 milliGRAM(s) Oral daily  celecoxib 200 milliGRAM(s) Oral every 12 hours  lactated ringers. 1000 milliLiter(s) (75 mL/Hr) IV Continuous <Continuous>  methocarbamol 750 milliGRAM(s) Oral every 8 hours  pantoprazole    Tablet 40 milliGRAM(s) Oral before breakfast  senna 2 Tablet(s) Oral at bedtime    MEDICATIONS  (PRN):  albuterol    90 MICROgram(s) HFA Inhaler 1 Puff(s) Inhalation every 4 hours PRN Shortness of Breath and/or Wheezing  aluminum hydroxide/magnesium hydroxide/simethicone Suspension 30 milliLiter(s) Oral every 12 hours PRN Indigestion  HYDROmorphone  Injectable 0.5 milliGRAM(s) IV Push every 6 hours PRN BREATKNROUGH PAIN NOT CONTROLLED WITH CURRENT MEDS  magnesium hydroxide Suspension 30 milliLiter(s) Oral every 12 hours PRN Constipation  melatonin 3 milliGRAM(s) Oral at bedtime PRN Insomnia  ondansetron Injectable 4 milliGRAM(s) IV Push every 6 hours PRN Nausea and/or Vomiting  oxyCODONE    IR 10 milliGRAM(s) Oral every 3 hours PRN Severe Pain (7 - 10)  oxyCODONE    IR 5 milliGRAM(s) Oral every 3 hours PRN Moderate Pain (4 - 6)      LABS:                          9.1    10.95 )-----------( 244      ( 09 Jan 2024 05:26 )             28.2     01-09    140  |  105  |  7.5<L>  ----------------------------<  98  4.4   |  27.0  |  0.69    Ca    8.5      09 Jan 2024 05:26      RADIOLOGY & ADDITIONAL TESTS:      REVIEW OF SYSTEMS:      Vital Signs Last 24 Hrs  T(C): 36.5 (09 Jan 2024 09:10), Max: 36.9 (08 Jan 2024 12:22)  T(F): 97.7 (09 Jan 2024 09:10), Max: 98.4 (08 Jan 2024 12:22)  HR: 55 (09 Jan 2024 09:10) (55 - 120)  BP: 106/66 (09 Jan 2024 09:10) (82/45 - 109/87)  BP(mean): 89 (08 Jan 2024 17:30) (55 - 95)  RR: 18 (09 Jan 2024 09:10) (12 - 20)  SpO2: 99% (09 Jan 2024 09:10) (95% - 100%)    Parameters below as of 09 Jan 2024 09:10  Patient On (Oxygen Delivery Method): room air      PHYSICAL EXAM:    GENERAL: NAD, well-groomed, well-developed  HEAD:  Atraumatic, Normocephalic  EYES: EOMI, PERRLA, conjunctiva and sclera clear  NECK: Supple, No JVD, Normal thyroid  NERVOUS SYSTEM:  Alert & Oriented X3, no focal deficit  CHEST/LUNG: CTA b/l ,  no  rales, rhonchi, wheezing, or rubs  HEART: Regular rate and rhythm; No murmurs, rubs, or gallops  ABDOMEN: Soft, Nontender, Nondistended; Bowel sounds present  EXTREMITIES:  2+ Peripheral Pulses, No clubbing, cyanosis, or edema  LYMPH: No lymphadenopathy noted  SKIN: No rashes or lesions  Low back with dry and clean dressing + , WILFREDO+     I&O's Detail    08 Jan 2024 07:01  -  09 Jan 2024 07:00  --------------------------------------------------------  IN:  Total IN: 0 mL    OUT:    Drain (mL): 310 mL    Indwelling Catheter - Urethral (mL): 2850 mL    Voided (mL): 830 mL  Total OUT: 3990 mL    Total NET: -3990 mL      09 Jan 2024 07:01  -  09 Jan 2024 11:14  --------------------------------------------------------  IN:  Total IN: 0 mL    OUT:    Drain (mL): 50 mL    Voided (mL): 400 mL  Total OUT: 450 mL    Total NET: -450 mL

## 2024-01-09 NOTE — PROGRESS NOTE ADULT - ASSESSMENT
58-year-old female pmhx HLD/ osteoporosis not yet on medication/ chronic daily smoker >30year pack hx/Cervical disc disease/ Chronic low back pain with longstanding history of 4-5 and 5-1 lumbar degenerative disc disease and chronic low back pain, going on approximately 20 years. pain is described as 10/10 sharp/ stabbing, radiation to BLE, worsened by bending and worsened by lifting. Conservative mgmt includes NSAIDs, PT, injection therapy with no relief- she is exhausted multiple courses of physical therapy and injection therapy. As per DR. Rubin- longstanding history of low back pain, per patient she will suffer from incomplete resolution of back pain, failed physical therapy, failed injection therapy, recommended for L4-L5, L5-S1 instrumented fusion, interbody fusion laminectomy and posterior lateral fusion, now scheduled 01/08/2023. Reports gait is limited 2/2 pain, and also a/w neuropathy ernie/ hands and feet.     1. Chronic low back pain /  Spondylolisthesis ,   s/p lumbar fusion ,   PT/OT/pain mgmt  DVT prophylaxis- as per ortho  Abx as per SCIP- given   Incentive spirometry  Prophylaxis of opioid  induced constipation.  Wound care/ WB status as per ortho   Drain and output as per ortho   EBL - 200 ml , asymptomatic     2. Hx of OP  - not on any medications yet -   will need f/u with PMD for further treatment     3. BP noted on lower side SBP in 90'S - as per patient that is at her base ,   PATIENT IS ASYMPTOMATIC   continue maintenance ivf till 3 pm then d/c     4. Bradycardic episode noted while asleep at 45  at 6 am , patient admits that she snores ,   HR now in 50-60's , patient is asymptomatic . Not on any BB.   Advised to have Sleep study as on outpatient to r/o Sleep Apnea .   Will d/c cardiac monitor .     5. DVT prophylaxis  - as per ortho protocol  Opioid induced constipation  prophylaxis - bowel regimen     Dispo plan is Home once drain removed .      Medically stable to d/c once cleared by physical therapy / ortho .   Will sign off , please recall if any medical issue .

## 2024-01-09 NOTE — DISCHARGE NOTE PROVIDER - CARE PROVIDERS DIRECT ADDRESSES
[email or web address]

## 2024-01-09 NOTE — DISCHARGE NOTE PROVIDER - NSDCMRMEDTOKEN_GEN_ALL_CORE_FT
acetaminophen 325 mg oral tablet: 3 tab(s) orally every 8 hours cont x 1 week then as needed MDD: 3  Aspirin  mg oral delayed release tablet: 1 tab(s) orally once a day last dose on 2/5/24 MDD: 1  methocarbamol 500 mg oral tablet: 1 tab(s) orally every 8 hours as needed for  muscle spasm MDD: 3  oxyCODONE 10 mg oral tablet: 1 tab(s) orally every 6 hours as needed for  severe pain Patient may take 0.5-1 tab every 6 hrs as needed for mod severe pain MDD: 4  Senna S 50 mg-8.6 mg oral tablet: 2 tab(s) orally once a day (at bedtime) as needed for  constipation MDD: 2   acetaminophen 325 mg oral tablet: 3 tab(s) orally every 8 hours cont x 1 week then as needed MDD: 3  Aspirin  mg oral delayed release tablet: 1 tab(s) orally once a day last dose on 2/5/24 MDD: 1  methocarbamol 500 mg oral tablet: 1 tab(s) orally every 8 hours as needed for  muscle spasm MDD: 3  ondansetron 4 mg oral tablet: 1 tab(s) orally every 6 hours as needed for  nausea MDD: 4  oxyCODONE 10 mg oral tablet: 1 tab(s) orally every 6 hours as needed for  severe pain Patient may take 0.5-1 tab every 6 hrs as needed for mod severe pain MDD: 4  Senna S 50 mg-8.6 mg oral tablet: 2 tab(s) orally once a day (at bedtime) as needed for  constipation MDD: 2

## 2024-01-09 NOTE — DIETITIAN INITIAL EVALUATION ADULT - PERTINENT MEDS FT
MEDICATIONS  (STANDING):  acetaminophen     Tablet .. 975 milliGRAM(s) Oral every 8 hours  aprepitant 40 milliGRAM(s) Oral once  aspirin enteric coated 325 milliGRAM(s) Oral daily  celecoxib 200 milliGRAM(s) Oral every 12 hours  lactated ringers. 1000 milliLiter(s) (75 mL/Hr) IV Continuous <Continuous>  methocarbamol 750 milliGRAM(s) Oral every 8 hours  pantoprazole    Tablet 40 milliGRAM(s) Oral before breakfast  senna 2 Tablet(s) Oral at bedtime    MEDICATIONS  (PRN):  albuterol    90 MICROgram(s) HFA Inhaler 1 Puff(s) Inhalation every 4 hours PRN Shortness of Breath and/or Wheezing  aluminum hydroxide/magnesium hydroxide/simethicone Suspension 30 milliLiter(s) Oral every 12 hours PRN Indigestion  HYDROmorphone  Injectable 0.5 milliGRAM(s) IV Push every 6 hours PRN BREATKNROUGH PAIN NOT CONTROLLED WITH CURRENT MEDS  magnesium hydroxide Suspension 30 milliLiter(s) Oral every 12 hours PRN Constipation  melatonin 3 milliGRAM(s) Oral at bedtime PRN Insomnia  ondansetron Injectable 4 milliGRAM(s) IV Push every 6 hours PRN Nausea and/or Vomiting  oxyCODONE    IR 10 milliGRAM(s) Oral every 3 hours PRN Severe Pain (7 - 10)  oxyCODONE    IR 5 milliGRAM(s) Oral every 3 hours PRN Moderate Pain (4 - 6)

## 2024-01-09 NOTE — DISCHARGE NOTE PROVIDER - CARE PROVIDER_API CALL
Jaun Rubin  Orthopaedic Surgery  [address] [place] [id number]  Phone: [phone]  Fax: [phone]  Follow Up Time:    Jaun Rubin  Orthopaedic Surgery  [address] [place] [id number]  Phone: [phone]  Fax: [phone]  Follow Up Time:    Jaun Rubin  Orthopaedic Surgery  [address] [place] [id number]  Phone: [phone]  Fax: [phone]  Follow Up Time:

## 2024-01-09 NOTE — DIETITIAN INITIAL EVALUATION ADULT - OTHER INFO
58-year-old female pmhx HLD/ osteoporosis not yet on medication/ chronic daily smoker >30year pack hx/Cervical disc disease/ Chronic low back pain with longstanding history of 4-5 and 5-1 lumbar degenerative disc disease and chronic low back pain, going on approximately 20 years. pain is described as 10/10 sharp/ stabbing, radiation to BLE, worsened by bending and worsened by lifting. Conservative mgmt includes NSAIDs, PT, injection therapy with no relief- she is exhausted multiple courses of physical therapy and injection therapy. As per DR. Rubin- longstanding history of low back pain, per patient she will suffer from incomplete resolution of back pain, failed physical therapy, failed injection therapy, recommended for L4-L5, L5-S1 instrumented fusion, interbody fusion laminectomy and posterior lateral fusion, now scheduled 01/08/2023. Reports gait is limited 2/2 pain, and also a/w neuropathy ernie/ hands and feet.    Chronic low back pain /  Spondylolisthesis ,

## 2024-01-09 NOTE — DISCHARGE NOTE PROVIDER - NSDCFUADDINST_GEN_ALL_CORE_FT
Follow-up with Dr. Rubin within 7-10 days. Leave dressing in place until office follow-up unless saturated call office.  Showering at 48 hour is permitted.  Pain medications as indicated and titrated to patient's needs. Patient will begin aspirin 325mg twice daily for 4 weeks post-operatively for clot prevention.  LSO brace as needed for comfort when out of bed. Ambulate with assistance of walker. Contact office if the wound becomes red, opens or discharge increases. Follow-up with Dr. Rubin within 7-10 days. Leave dressing in place until office follow-up unless saturated call office.  Showering at 48 hour is permitted.  Pain medications as indicated and titrated to patient's needs. Patient will begin aspirin 325mg twice daily for 4 weeks post-operatively for clot prevention.  LSO brace as needed for comfort when out of bed. Ambulate with assistance of walker. Contact office if the wound becomes red, opens or discharge increases. Patient may take zofran for nausea as needed as prescribed and Robaxin for muscle spasms as needed as prescribed.

## 2024-01-09 NOTE — DISCHARGE NOTE PROVIDER - DETAILS OF MALNUTRITION DIAGNOSIS/DIAGNOSES
This patient has been assessed with a concern for Malnutrition and was treated during this hospitalization for the following Nutrition diagnosis/diagnoses:     -  01/09/2024: Severe protein-calorie malnutrition   -  01/09/2024: Underweight (BMI < 19)

## 2024-01-09 NOTE — DISCHARGE NOTE PROVIDER - NSDCQMSTAIRS_GEN_ALL_CORE
PT DAILY TREATMENT NOTE     Patient Name: Nicole Oliver  Date:2018  : 1953  [x]  Patient  Verified  Payor: BLUE CROSS / Plan:  [facility] [place] / Product Type: PPO /    In time:10;00  Out time:10:40  Total Treatment Time (min): 40  Visit #: 2 of 10    Treatment Area: Low back pain [M54.5]  Left hip pain [M25.552]    SUBJECTIVE  Pain Level (0-10 scale): 3  Any medication changes, allergies to medications, adverse drug reactions, diagnosis change, or new procedure performed?: [x] No    [] Yes (see summary sheet for update)  Subjective functional status/changes:   [] No changes reported  Last night was bad, but I just did to much during the day. OBJECTIVE    40 min Therapeutic Exercise:  [] See flow sheet :   Rationale: increase ROM and increase strength to improve the patients ability to increase activity tolerance,  symptom management      With   [] TE   [] TA   [] neuro   [] other: Patient Education: [x] Review HEP    [] Progressed/Changed HEP based on:   [] positioning   [] body mechanics   [] transfers   [] heat/ice application    [] other:      Other Objective/Functional Measures: initiated ex program     Pain Level (0-10 scale) post treatment:  2    ASSESSMENT/Changes in Function: First follow-up after eval. Ambulating with SPC in right UE with reduce stress to left LS. Good VMO activation in right > left LE with SLR. Pt stated that she had more pain \"hitch\" this morning when waking,but was able to resolve with right SB    Patient will continue to benefit from skilled PT services to modify and progress therapeutic interventions, address functional mobility deficits, address ROM deficits, address strength deficits, analyze and address soft tissue restrictions, analyze and cue movement patterns, analyze and modify body mechanics/ergonomics and assess and modify postural abnormalities to attain remaining goals.      []  See Plan of Care  []  See progress note/recertification  []  See Discharge Summary         Progress towards goals / Updated goals:  Goal: Patient will initiate and be compliant with HEP in order to progress toward long term goals  Status at last note/certification: issued and reviewed HEP  Goal Met  Long Term Goals: To be accomplished in 10 treatments:  Goal: Patient will improve FOTO assessment score to 67 in order to demonstrate improved functional ability  Status at last note/certification: 54  Goal: Patient will demonstrate grossly full and pain free lumbar ROM in order to improve ease of household tasks  Status at last note/certification: full but painful in flexion, extension, Left S/B, and Left rotation  Goal: Patient will report worst pain in Left hip/LE <6/10 in order to progress toward personal goals  Status at last note/certification: 22/50  Goal: Patient will report a 65% improvement in overall function in order to begin returning to recreational activities.    Status at last note/certification: n/a    PLAN  [x]  Upgrade activities as tolerated     []  Continue plan of care  []  Update interventions per flow sheet       []  Discharge due to:_  []  Other:_      Pili Maldonado, LYLE 6/26/2018  10:02 AM    Future Appointments  Date Time Provider Linda Dykes   7/2/2018 11:30  [address] [facility]   7/3/2018 11:00  [address] [facility]   7/9/2018 11:30 AM Johnny Herman PT [facility]   7/10/2018 10:00  [address] [facility]   7/16/2018 10:00  [address] [facility]   7/17/2018 10:00 AM Glenny Roman [facility]   7/23/2018 11:30 AM Johnny Herman [facility]   7/24/2018 9:30 AM Gloria [facility] [place] [facility]   7/30/2018 9:30 AM Gloria [facility] [place] [facility]   7/31/2018 9:30 AM Glenny Nunez [facility] Yes

## 2024-01-09 NOTE — OCCUPATIONAL THERAPY INITIAL EVALUATION ADULT - PERTINENT HX OF CURRENT PROBLEM, REHAB EVAL
As per MD note: 58-year-old female pmhx HLD/ osteoporosis not yet on medication/ chronic daily smoker >30year pack hx/Cervical disc disease/ Chronic low back pain/ Lumbar spondylosis, presents for PST. Patient with longstanding history of 4-5 and 5-1 lumbar degenerative disc disease and chronic low back pain, going on approximately 20 years. pain is described as 10/10 sharp/ stabbing, radiation to BLE, worsened by bending and worsened by lifting. Conservative mgmt includes NSAIDs, PT, injection therapy with no relief- she is exhausted multiple courses of physical therapy and injection therapy. As per DR. Rubin- longstanding history of low back pain, per patient she will suffer from incomplete resolution of back pain, failed physical therapy, failed injection therapy, recommended for L4-L5, L5-S1 instrumented fusion, interbody fusion laminectomy and posterior lateral fusion, now scheduled 01/08/2023. Reports gait is limited 2/2 pain, and also a/w neuropathy ernie/ hands and feet. o/w she reports feeling healthy today with no acute concerns. denies use of ast. device. denies use of bracing.

## 2024-01-09 NOTE — DISCHARGE NOTE PROVIDER - NSDCFUSCHEDAPPT_GEN_ALL_CORE_FT
[facility]  ORTHOSURG [address]  Scheduled Appointment: 01/19/2024     [facility]  ORTHOSURG [address]  Scheduled Appointment: 01/19/2024     [facility]  ORTHOSURG [address]  Scheduled Appointment: 01/19/2024

## 2024-01-09 NOTE — DIETITIAN INITIAL EVALUATION ADULT - PERTINENT LABORATORY DATA
01-09    140  |  105  |  7.5<L>  ----------------------------<  98  4.4   |  27.0  |  0.69    Ca    8.5      09 Jan 2024 05:26    A1C with Estimated Average Glucose Result: 5.4 % (12-20-23 @ 16:55)

## 2024-01-09 NOTE — DIETITIAN INITIAL EVALUATION ADULT - ORAL INTAKE PTA/DIET HISTORY
Pt reports that she has been eating poorly for years related to limited access to food. Pt endorses being homeless at one point ( not anymore),  On food stamps, but cant get to the store. Pt also with Chronic pain and lack of appetite and  motivation to prepare meals. Pt states UBW 120lbs - current weight 104 lbs. Education provided. Will also refer pt to food is health.

## 2024-01-10 LAB
ANION GAP SERPL CALC-SCNC: 10 MMOL/L — SIGNIFICANT CHANGE UP (ref 5–17)
ANION GAP SERPL CALC-SCNC: 10 MMOL/L — SIGNIFICANT CHANGE UP (ref 5–17)
BASOPHILS # BLD AUTO: 0.04 K/UL — SIGNIFICANT CHANGE UP (ref 0–0.2)
BASOPHILS # BLD AUTO: 0.04 K/UL — SIGNIFICANT CHANGE UP (ref 0–0.2)
BASOPHILS NFR BLD AUTO: 0.5 % — SIGNIFICANT CHANGE UP (ref 0–2)
BASOPHILS NFR BLD AUTO: 0.5 % — SIGNIFICANT CHANGE UP (ref 0–2)
BUN SERPL-MCNC: 12 MG/DL — SIGNIFICANT CHANGE UP (ref 8–20)
BUN SERPL-MCNC: 12 MG/DL — SIGNIFICANT CHANGE UP (ref 8–20)
CALCIUM SERPL-MCNC: 8.5 MG/DL — SIGNIFICANT CHANGE UP (ref 8.4–10.5)
CALCIUM SERPL-MCNC: 8.5 MG/DL — SIGNIFICANT CHANGE UP (ref 8.4–10.5)
CHLORIDE SERPL-SCNC: 104 MMOL/L — SIGNIFICANT CHANGE UP (ref 96–108)
CHLORIDE SERPL-SCNC: 104 MMOL/L — SIGNIFICANT CHANGE UP (ref 96–108)
CO2 SERPL-SCNC: 26 MMOL/L — SIGNIFICANT CHANGE UP (ref 22–29)
CO2 SERPL-SCNC: 26 MMOL/L — SIGNIFICANT CHANGE UP (ref 22–29)
CREAT SERPL-MCNC: 0.86 MG/DL — SIGNIFICANT CHANGE UP (ref 0.5–1.3)
CREAT SERPL-MCNC: 0.86 MG/DL — SIGNIFICANT CHANGE UP (ref 0.5–1.3)
EGFR: 78 ML/MIN/1.73M2 — SIGNIFICANT CHANGE UP
EGFR: 78 ML/MIN/1.73M2 — SIGNIFICANT CHANGE UP
EOSINOPHIL # BLD AUTO: 0.11 K/UL — SIGNIFICANT CHANGE UP (ref 0–0.5)
EOSINOPHIL # BLD AUTO: 0.11 K/UL — SIGNIFICANT CHANGE UP (ref 0–0.5)
EOSINOPHIL NFR BLD AUTO: 1.3 % — SIGNIFICANT CHANGE UP (ref 0–6)
EOSINOPHIL NFR BLD AUTO: 1.3 % — SIGNIFICANT CHANGE UP (ref 0–6)
GLUCOSE SERPL-MCNC: 90 MG/DL — SIGNIFICANT CHANGE UP (ref 70–99)
GLUCOSE SERPL-MCNC: 90 MG/DL — SIGNIFICANT CHANGE UP (ref 70–99)
HCT VFR BLD CALC: 27.8 % — LOW (ref 34.5–45)
HCT VFR BLD CALC: 27.8 % — LOW (ref 34.5–45)
HGB BLD-MCNC: 9.1 G/DL — LOW (ref 11.5–15.5)
HGB BLD-MCNC: 9.1 G/DL — LOW (ref 11.5–15.5)
IMM GRANULOCYTES NFR BLD AUTO: 0.4 % — SIGNIFICANT CHANGE UP (ref 0–0.9)
IMM GRANULOCYTES NFR BLD AUTO: 0.4 % — SIGNIFICANT CHANGE UP (ref 0–0.9)
LYMPHOCYTES # BLD AUTO: 2.62 K/UL — SIGNIFICANT CHANGE UP (ref 1–3.3)
LYMPHOCYTES # BLD AUTO: 2.62 K/UL — SIGNIFICANT CHANGE UP (ref 1–3.3)
LYMPHOCYTES # BLD AUTO: 30.7 % — SIGNIFICANT CHANGE UP (ref 13–44)
LYMPHOCYTES # BLD AUTO: 30.7 % — SIGNIFICANT CHANGE UP (ref 13–44)
MCHC RBC-ENTMCNC: 31.7 PG — SIGNIFICANT CHANGE UP (ref 27–34)
MCHC RBC-ENTMCNC: 31.7 PG — SIGNIFICANT CHANGE UP (ref 27–34)
MCHC RBC-ENTMCNC: 32.7 GM/DL — SIGNIFICANT CHANGE UP (ref 32–36)
MCHC RBC-ENTMCNC: 32.7 GM/DL — SIGNIFICANT CHANGE UP (ref 32–36)
MCV RBC AUTO: 96.9 FL — SIGNIFICANT CHANGE UP (ref 80–100)
MCV RBC AUTO: 96.9 FL — SIGNIFICANT CHANGE UP (ref 80–100)
MONOCYTES # BLD AUTO: 0.5 K/UL — SIGNIFICANT CHANGE UP (ref 0–0.9)
MONOCYTES # BLD AUTO: 0.5 K/UL — SIGNIFICANT CHANGE UP (ref 0–0.9)
MONOCYTES NFR BLD AUTO: 5.9 % — SIGNIFICANT CHANGE UP (ref 2–14)
MONOCYTES NFR BLD AUTO: 5.9 % — SIGNIFICANT CHANGE UP (ref 2–14)
NEUTROPHILS # BLD AUTO: 5.24 K/UL — SIGNIFICANT CHANGE UP (ref 1.8–7.4)
NEUTROPHILS # BLD AUTO: 5.24 K/UL — SIGNIFICANT CHANGE UP (ref 1.8–7.4)
NEUTROPHILS NFR BLD AUTO: 61.2 % — SIGNIFICANT CHANGE UP (ref 43–77)
NEUTROPHILS NFR BLD AUTO: 61.2 % — SIGNIFICANT CHANGE UP (ref 43–77)
PLATELET # BLD AUTO: 220 K/UL — SIGNIFICANT CHANGE UP (ref 150–400)
PLATELET # BLD AUTO: 220 K/UL — SIGNIFICANT CHANGE UP (ref 150–400)
POTASSIUM SERPL-MCNC: 4.1 MMOL/L — SIGNIFICANT CHANGE UP (ref 3.5–5.3)
POTASSIUM SERPL-MCNC: 4.1 MMOL/L — SIGNIFICANT CHANGE UP (ref 3.5–5.3)
POTASSIUM SERPL-SCNC: 4.1 MMOL/L — SIGNIFICANT CHANGE UP (ref 3.5–5.3)
POTASSIUM SERPL-SCNC: 4.1 MMOL/L — SIGNIFICANT CHANGE UP (ref 3.5–5.3)
RBC # BLD: 2.87 M/UL — LOW (ref 3.8–5.2)
RBC # BLD: 2.87 M/UL — LOW (ref 3.8–5.2)
RBC # FLD: 13.2 % — SIGNIFICANT CHANGE UP (ref 10.3–14.5)
RBC # FLD: 13.2 % — SIGNIFICANT CHANGE UP (ref 10.3–14.5)
SODIUM SERPL-SCNC: 140 MMOL/L — SIGNIFICANT CHANGE UP (ref 135–145)
SODIUM SERPL-SCNC: 140 MMOL/L — SIGNIFICANT CHANGE UP (ref 135–145)
WBC # BLD: 8.54 K/UL — SIGNIFICANT CHANGE UP (ref 3.8–10.5)
WBC # BLD: 8.54 K/UL — SIGNIFICANT CHANGE UP (ref 3.8–10.5)
WBC # FLD AUTO: 8.54 K/UL — SIGNIFICANT CHANGE UP (ref 3.8–10.5)
WBC # FLD AUTO: 8.54 K/UL — SIGNIFICANT CHANGE UP (ref 3.8–10.5)

## 2024-01-10 RX ORDER — MAGNESIUM HYDROXIDE 400 MG/1
30 TABLET, CHEWABLE ORAL DAILY
Refills: 0 | Status: DISCONTINUED | OUTPATIENT
Start: 2024-01-10 | End: 2024-01-11

## 2024-01-10 RX ADMIN — OXYCODONE HYDROCHLORIDE 10 MILLIGRAM(S): 5 TABLET ORAL at 13:08

## 2024-01-10 RX ADMIN — PANTOPRAZOLE SODIUM 40 MILLIGRAM(S): 20 TABLET, DELAYED RELEASE ORAL at 05:12

## 2024-01-10 RX ADMIN — OXYCODONE HYDROCHLORIDE 10 MILLIGRAM(S): 5 TABLET ORAL at 14:08

## 2024-01-10 RX ADMIN — OXYCODONE HYDROCHLORIDE 10 MILLIGRAM(S): 5 TABLET ORAL at 11:00

## 2024-01-10 RX ADMIN — MAGNESIUM HYDROXIDE 30 MILLILITER(S): 400 TABLET, CHEWABLE ORAL at 19:28

## 2024-01-10 RX ADMIN — OXYCODONE HYDROCHLORIDE 10 MILLIGRAM(S): 5 TABLET ORAL at 03:19

## 2024-01-10 RX ADMIN — METHOCARBAMOL 750 MILLIGRAM(S): 500 TABLET, FILM COATED ORAL at 17:41

## 2024-01-10 RX ADMIN — CELECOXIB 200 MILLIGRAM(S): 200 CAPSULE ORAL at 18:16

## 2024-01-10 RX ADMIN — OXYCODONE HYDROCHLORIDE 10 MILLIGRAM(S): 5 TABLET ORAL at 10:00

## 2024-01-10 RX ADMIN — Medication 325 MILLIGRAM(S): at 14:21

## 2024-01-10 RX ADMIN — OXYCODONE HYDROCHLORIDE 10 MILLIGRAM(S): 5 TABLET ORAL at 22:30

## 2024-01-10 RX ADMIN — OXYCODONE HYDROCHLORIDE 10 MILLIGRAM(S): 5 TABLET ORAL at 04:19

## 2024-01-10 RX ADMIN — OXYCODONE HYDROCHLORIDE 10 MILLIGRAM(S): 5 TABLET ORAL at 06:33

## 2024-01-10 RX ADMIN — SENNA PLUS 2 TABLET(S): 8.6 TABLET ORAL at 21:29

## 2024-01-10 RX ADMIN — OXYCODONE HYDROCHLORIDE 10 MILLIGRAM(S): 5 TABLET ORAL at 21:30

## 2024-01-10 RX ADMIN — CELECOXIB 200 MILLIGRAM(S): 200 CAPSULE ORAL at 19:14

## 2024-01-10 NOTE — PROGRESS NOTE ADULT - NUTRITIONAL ASSESSMENT
This patient has been assessed with a concern for Malnutrition and has been determined to have a diagnosis/diagnoses of Severe protein-calorie malnutrition and Underweight (BMI < 19).    This patient is being managed with:   Diet Regular-  Entered: Jan 8 2024  1:10PM

## 2024-01-10 NOTE — CHART NOTE - NSCHARTNOTEFT_GEN_A_CORE
[facility] Note:    Initial Screening    Date of Initial Screenin/10/24    Patient qualifies for [facility] Program  [  ] Patient qualifies for [employer] [facility] Program w/ health condition  [ x ] Patient does not qualify for [employer] [facility] Program w/ no health conditions    Diagnosis that qualifies patient for program  [  ] Hypertension  [  ] Diabetes  [  ] Unintended weight loss  [  ] Obesity  [  ] CHF  [  ] Other    Additional Comments:    Current Patient Diet: Diet, Regular (24 @ 09:00)    Actions Taken:  [ x] Spoke with patient  [ x ] [employer] survey completed  Additional Comments: Provided with community resources through [employer] and list of local food pantries. Pt has SNAP, but would also like a referral to meal delivery services (discussed [employer]). Pt reports not having consistent meals daily, so reviewed shelf stable items to have on hand as needed. Pt noted possible D/C tomorrow, so RD will provide groceries and further information. RD to remain available.       Non-qualification for [facility]  [   ] D/C to [facility]  [   ] Referred to Social Work  [  ] Declined [facility]  [   ] D/C Before Seen By RD  Participant Phone Number:  JASON Comments:      Discharge Visit  [x  ] Initial Screening already completed    Date of D/C:  [  ]  Patient provided with healthy groceries  [ x ] Follow Up appointment made  [ x ] Other community outreach given  [  ] Help with SNAP application at F/U appointment  [  ] Patient D/C while RD was unavailable. Will call to schedule pick-up [facility] Note:    Initial Screening    Date of Initial Screenin/10/24    Patient qualifies for [facility] Program  [  ] Patient qualifies for [facility] Program w/ health condition  [ x ] Patient does not qualify for [employer] [facility] Program w/ no health conditions    Diagnosis that qualifies patient for program  [  ] Hypertension  [  ] Diabetes  [  ] Unintended weight loss  [  ] Obesity  [  ] CHF  [  ] Other    Additional Comments:    Current Patient Diet: Diet, Regular (24 @ 09:00)    Actions Taken:  [ x] Spoke with patient  [ x ] [employer] survey completed  Additional Comments: Provided with community resources through [employer] and list of local food pantries. Pt has SNAP, but would also like a referral to meal delivery services (discussed [employer]). Pt reports not having consistent meals daily, so reviewed shelf stable items to have on hand as needed. Pt noted possible D/C tomorrow, so RD will provide groceries and further information. RD to remain available.       Non-qualification for [facility] Program  [   ] D/C to [facility]  [   ] Referred to Social Work  [  ] Declined [facility] Program  [   ] D/C Before Seen By RD  Participant Phone Number:  JASON Comments:      Discharge Visit  [x  ] Initial Screening already completed    Date of D/C:  [  ]  Patient provided with healthy groceries  [ x ] Follow Up appointment made  [ x ] Other community outreach given  [  ] Help with SNAP application at F/U appointment  [  ] Patient D/C while RD was unavailable. Will call to schedule pick-up

## 2024-01-10 NOTE — PROGRESS NOTE ADULT - SUBJECTIVE AND OBJECTIVE BOX
ECHO [id number]    STATUS POST:  lumbar laminectomy L4, L5,S1/L4-L5, L5-S1 Posterior instrumented, non instrumented and interbody fusion for lumbar stenosis with neurogenic claudication, spondylolisthesis with back pain respectively    SUBJECTIVE: Patient seen and examined doing well. Reports ambulating with PT and independently. Reports pain is well controlled.   Back Pain controlled, lower extremity pain resolving    OBJECTIVE:     Vital Signs Last 24 Hrs  T(C): 36.5 (10 Eloy 2024 04:05), Max: 36.9 (09 Jan 2024 21:24)  T(F): 97.7 (10 Eloy 2024 04:05), Max: 98.5 (09 Jan 2024 21:24)  HR: 67 (10 Eloy 2024 04:05) (55 - 67)  BP: 110/72 (10 Eloy 2024 06:30) (98/61 - 110/72)  BP(mean): --  RR: 18 (10 Eoly 2024 04:05) (18 - 18)  SpO2: 92% (10 Eloy 2024 04:05) (92% - 99%)    Parameters below as of 10 Eloy 2024 04:05  Patient On (Oxygen Delivery Method): room air      Constitutional: Pleasant in no acute distress  Psych:A&Ox3    Spine:          Dressing:  clean/dry/intact                           WILFREDO drain in place, patent (75/215cc)               Sensation:            Upper extremity          grossly intact manually          Lower extremity           grossly intact manually                               Motor:          Lower extremity                        HF(L2)   KE(L3)    TA(L4)   EHL(L5)  GS(S1)                                                 R        5/5        5/5        5/5       5/5         5/5                                               L         5/5        5/5       5/5       5/5          5/5                                                        [x] warm well perfused; capillary refill <3 seconds                A/P : 58yFemale   S/P lumbar laminectomy and fusion as above  POD#2  -    Pain control- multimodal.  Avoid NSAIDS since they can deter fusion.  -    DVT ppx: [ x]SCDs[ x] Pharmacolgic   mg once daily x 28 days  -    Monitor Drain Output, keep in another day.   -    PT/OT WBAT, balance and gait  -    probable home 3-5 days       ECHO [id number]    STATUS POST:  lumbar laminectomy L4, L5,S1/L4-L5, L5-S1 Posterior instrumented, non instrumented and interbody fusion for lumbar stenosis with neurogenic claudication, spondylolisthesis with back pain respectively    SUBJECTIVE: Patient seen and examined doing well. Reports ambulating with PT and independently. Reports pain is well controlled.   Back Pain controlled, lower extremity pain resolving    OBJECTIVE:     Vital Signs Last 24 Hrs  T(C): 36.5 (10 Eloy 2024 04:05), Max: 36.9 (09 Jan 2024 21:24)  T(F): 97.7 (10 Eloy 2024 04:05), Max: 98.5 (09 Jan 2024 21:24)  HR: 67 (10 Eloy 2024 04:05) (55 - 67)  BP: 110/72 (10 Eloy 2024 06:30) (98/61 - 110/72)  BP(mean): --  RR: 18 (10 Eloy 2024 04:05) (18 - 18)  SpO2: 92% (10 Eloy 2024 04:05) (92% - 99%)    Parameters below as of 10 Eloy 2024 04:05  Patient On (Oxygen Delivery Method): room air      Constitutional: Pleasant in no acute distress  Psych:A&Ox3    Spine:          Dressing:  clean/dry/intact                           WILFREDO drain in place, patent (75/215cc)               Sensation:            Upper extremity          grossly intact manually          Lower extremity           grossly intact manually                               Motor:          Lower extremity                        HF(L2)   KE(L3)    TA(L4)   EHL(L5)  GS(S1)                                                 R        5/5        5/5        5/5       5/5         5/5                                               L         5/5        5/5       5/5       5/5          5/5                                                        [x] warm well perfused; capillary refill <3 seconds                A/P : 58yFemale   S/P lumbar laminectomy and fusion as above  POD#2  -    Pain control- multimodal.  Avoid NSAIDS since they can deter fusion.  -    DVT ppx: [ x]SCDs[ x] Pharmacolgic   mg once daily x 28 days  -    Monitor Drain Output, keep in another day.   -    PT/OT WBAT, balance and gait  -    probable home 3-5 days

## 2024-01-10 NOTE — PROGRESS NOTE ADULT - SUBJECTIVE AND OBJECTIVE BOX
Ortho PA note addendum:     Drain removed without incidence.   [place] dressings applied   Patient states she has not had a BM and is uncomfortable.   MOM ordered

## 2024-01-11 ENCOUNTER — TRANSCRIPTION ENCOUNTER (OUTPATIENT)
Age: 59
End: 2024-01-11

## 2024-01-11 VITALS
TEMPERATURE: 99 F | SYSTOLIC BLOOD PRESSURE: 103 MMHG | RESPIRATION RATE: 18 BRPM | DIASTOLIC BLOOD PRESSURE: 70 MMHG | OXYGEN SATURATION: 95 % | HEART RATE: 99 BPM

## 2024-01-11 LAB
ANION GAP SERPL CALC-SCNC: 8 MMOL/L — SIGNIFICANT CHANGE UP (ref 5–17)
ANION GAP SERPL CALC-SCNC: 8 MMOL/L — SIGNIFICANT CHANGE UP (ref 5–17)
BASOPHILS # BLD AUTO: 0.04 K/UL — SIGNIFICANT CHANGE UP (ref 0–0.2)
BASOPHILS # BLD AUTO: 0.04 K/UL — SIGNIFICANT CHANGE UP (ref 0–0.2)
BASOPHILS NFR BLD AUTO: 0.5 % — SIGNIFICANT CHANGE UP (ref 0–2)
BASOPHILS NFR BLD AUTO: 0.5 % — SIGNIFICANT CHANGE UP (ref 0–2)
BUN SERPL-MCNC: 13.2 MG/DL — SIGNIFICANT CHANGE UP (ref 8–20)
BUN SERPL-MCNC: 13.2 MG/DL — SIGNIFICANT CHANGE UP (ref 8–20)
CALCIUM SERPL-MCNC: 8.7 MG/DL — SIGNIFICANT CHANGE UP (ref 8.4–10.5)
CALCIUM SERPL-MCNC: 8.7 MG/DL — SIGNIFICANT CHANGE UP (ref 8.4–10.5)
CHLORIDE SERPL-SCNC: 102 MMOL/L — SIGNIFICANT CHANGE UP (ref 96–108)
CHLORIDE SERPL-SCNC: 102 MMOL/L — SIGNIFICANT CHANGE UP (ref 96–108)
CO2 SERPL-SCNC: 27 MMOL/L — SIGNIFICANT CHANGE UP (ref 22–29)
CO2 SERPL-SCNC: 27 MMOL/L — SIGNIFICANT CHANGE UP (ref 22–29)
CREAT SERPL-MCNC: 0.86 MG/DL — SIGNIFICANT CHANGE UP (ref 0.5–1.3)
CREAT SERPL-MCNC: 0.86 MG/DL — SIGNIFICANT CHANGE UP (ref 0.5–1.3)
EGFR: 78 ML/MIN/1.73M2 — SIGNIFICANT CHANGE UP
EGFR: 78 ML/MIN/1.73M2 — SIGNIFICANT CHANGE UP
EOSINOPHIL # BLD AUTO: 0.13 K/UL — SIGNIFICANT CHANGE UP (ref 0–0.5)
EOSINOPHIL # BLD AUTO: 0.13 K/UL — SIGNIFICANT CHANGE UP (ref 0–0.5)
EOSINOPHIL NFR BLD AUTO: 1.7 % — SIGNIFICANT CHANGE UP (ref 0–6)
EOSINOPHIL NFR BLD AUTO: 1.7 % — SIGNIFICANT CHANGE UP (ref 0–6)
GLUCOSE SERPL-MCNC: 86 MG/DL — SIGNIFICANT CHANGE UP (ref 70–99)
GLUCOSE SERPL-MCNC: 86 MG/DL — SIGNIFICANT CHANGE UP (ref 70–99)
HCT VFR BLD CALC: 30.3 % — LOW (ref 34.5–45)
HCT VFR BLD CALC: 30.3 % — LOW (ref 34.5–45)
HGB BLD-MCNC: 9.9 G/DL — LOW (ref 11.5–15.5)
HGB BLD-MCNC: 9.9 G/DL — LOW (ref 11.5–15.5)
IMM GRANULOCYTES NFR BLD AUTO: 0.3 % — SIGNIFICANT CHANGE UP (ref 0–0.9)
IMM GRANULOCYTES NFR BLD AUTO: 0.3 % — SIGNIFICANT CHANGE UP (ref 0–0.9)
LYMPHOCYTES # BLD AUTO: 1.8 K/UL — SIGNIFICANT CHANGE UP (ref 1–3.3)
LYMPHOCYTES # BLD AUTO: 1.8 K/UL — SIGNIFICANT CHANGE UP (ref 1–3.3)
LYMPHOCYTES # BLD AUTO: 23.6 % — SIGNIFICANT CHANGE UP (ref 13–44)
LYMPHOCYTES # BLD AUTO: 23.6 % — SIGNIFICANT CHANGE UP (ref 13–44)
MCHC RBC-ENTMCNC: 31.2 PG — SIGNIFICANT CHANGE UP (ref 27–34)
MCHC RBC-ENTMCNC: 31.2 PG — SIGNIFICANT CHANGE UP (ref 27–34)
MCHC RBC-ENTMCNC: 32.7 GM/DL — SIGNIFICANT CHANGE UP (ref 32–36)
MCHC RBC-ENTMCNC: 32.7 GM/DL — SIGNIFICANT CHANGE UP (ref 32–36)
MCV RBC AUTO: 95.6 FL — SIGNIFICANT CHANGE UP (ref 80–100)
MCV RBC AUTO: 95.6 FL — SIGNIFICANT CHANGE UP (ref 80–100)
MONOCYTES # BLD AUTO: 0.45 K/UL — SIGNIFICANT CHANGE UP (ref 0–0.9)
MONOCYTES # BLD AUTO: 0.45 K/UL — SIGNIFICANT CHANGE UP (ref 0–0.9)
MONOCYTES NFR BLD AUTO: 5.9 % — SIGNIFICANT CHANGE UP (ref 2–14)
MONOCYTES NFR BLD AUTO: 5.9 % — SIGNIFICANT CHANGE UP (ref 2–14)
NEUTROPHILS # BLD AUTO: 5.18 K/UL — SIGNIFICANT CHANGE UP (ref 1.8–7.4)
NEUTROPHILS # BLD AUTO: 5.18 K/UL — SIGNIFICANT CHANGE UP (ref 1.8–7.4)
NEUTROPHILS NFR BLD AUTO: 68 % — SIGNIFICANT CHANGE UP (ref 43–77)
NEUTROPHILS NFR BLD AUTO: 68 % — SIGNIFICANT CHANGE UP (ref 43–77)
PLATELET # BLD AUTO: 236 K/UL — SIGNIFICANT CHANGE UP (ref 150–400)
PLATELET # BLD AUTO: 236 K/UL — SIGNIFICANT CHANGE UP (ref 150–400)
POTASSIUM SERPL-MCNC: 3.9 MMOL/L — SIGNIFICANT CHANGE UP (ref 3.5–5.3)
POTASSIUM SERPL-MCNC: 3.9 MMOL/L — SIGNIFICANT CHANGE UP (ref 3.5–5.3)
POTASSIUM SERPL-SCNC: 3.9 MMOL/L — SIGNIFICANT CHANGE UP (ref 3.5–5.3)
POTASSIUM SERPL-SCNC: 3.9 MMOL/L — SIGNIFICANT CHANGE UP (ref 3.5–5.3)
RBC # BLD: 3.17 M/UL — LOW (ref 3.8–5.2)
RBC # BLD: 3.17 M/UL — LOW (ref 3.8–5.2)
RBC # FLD: 13 % — SIGNIFICANT CHANGE UP (ref 10.3–14.5)
RBC # FLD: 13 % — SIGNIFICANT CHANGE UP (ref 10.3–14.5)
SODIUM SERPL-SCNC: 137 MMOL/L — SIGNIFICANT CHANGE UP (ref 135–145)
SODIUM SERPL-SCNC: 137 MMOL/L — SIGNIFICANT CHANGE UP (ref 135–145)
WBC # BLD: 7.62 K/UL — SIGNIFICANT CHANGE UP (ref 3.8–10.5)
WBC # BLD: 7.62 K/UL — SIGNIFICANT CHANGE UP (ref 3.8–10.5)
WBC # FLD AUTO: 7.62 K/UL — SIGNIFICANT CHANGE UP (ref 3.8–10.5)
WBC # FLD AUTO: 7.62 K/UL — SIGNIFICANT CHANGE UP (ref 3.8–10.5)

## 2024-01-11 PROCEDURE — C9399: CPT

## 2024-01-11 PROCEDURE — C1713: CPT

## 2024-01-11 PROCEDURE — 85025 COMPLETE CBC W/AUTO DIFF WBC: CPT

## 2024-01-11 PROCEDURE — 97167 OT EVAL HIGH COMPLEX 60 MIN: CPT

## 2024-01-11 PROCEDURE — C1889: CPT

## 2024-01-11 PROCEDURE — 97163 PT EVAL HIGH COMPLEX 45 MIN: CPT

## 2024-01-11 PROCEDURE — 80048 BASIC METABOLIC PNL TOTAL CA: CPT

## 2024-01-11 PROCEDURE — 36415 COLL VENOUS BLD VENIPUNCTURE: CPT

## 2024-01-11 PROCEDURE — 76000 FLUOROSCOPY <1 HR PHYS/QHP: CPT

## 2024-01-11 RX ORDER — ONDANSETRON 8 MG/1
1 TABLET, FILM COATED ORAL
Qty: 20 | Refills: 0
Start: 2024-01-11 | End: 2024-01-15

## 2024-01-11 RX ORDER — ASPIRIN/CALCIUM CARB/MAGNESIUM 324 MG
1 TABLET ORAL
Qty: 26 | Refills: 0
Start: 2024-01-11

## 2024-01-11 RX ORDER — METHOCARBAMOL 500 MG/1
1 TABLET, FILM COATED ORAL
Qty: 15 | Refills: 0
Start: 2024-01-11 | End: 2024-01-15

## 2024-01-11 RX ORDER — SENNOSIDES/DOCUSATE SODIUM 8.6MG-50MG
2 TABLET ORAL
Qty: 20 | Refills: 0
Start: 2024-01-11

## 2024-01-11 RX ORDER — OXYCODONE HYDROCHLORIDE 5 MG/1
1 TABLET ORAL
Qty: 20 | Refills: 0
Start: 2024-01-11 | End: 2024-01-15

## 2024-01-11 RX ORDER — ACETAMINOPHEN 500 MG
3 TABLET ORAL
Qty: 40 | Refills: 0
Start: 2024-01-11

## 2024-01-11 RX ADMIN — CELECOXIB 200 MILLIGRAM(S): 200 CAPSULE ORAL at 06:33

## 2024-01-11 RX ADMIN — CELECOXIB 200 MILLIGRAM(S): 200 CAPSULE ORAL at 05:32

## 2024-01-11 RX ADMIN — ONDANSETRON 4 MILLIGRAM(S): 8 TABLET, FILM COATED ORAL at 13:59

## 2024-01-11 RX ADMIN — OXYCODONE HYDROCHLORIDE 10 MILLIGRAM(S): 5 TABLET ORAL at 11:40

## 2024-01-11 RX ADMIN — PANTOPRAZOLE SODIUM 40 MILLIGRAM(S): 20 TABLET, DELAYED RELEASE ORAL at 05:33

## 2024-01-11 RX ADMIN — Medication 325 MILLIGRAM(S): at 11:30

## 2024-01-11 NOTE — DISCHARGE NOTE NURSING/CASE MANAGEMENT/SOCIAL WORK - NSDCPEFALRISK_GEN_ALL_CORE
For information on Fall & Injury Prevention, visit: [email or web address] OR  [email or web address] OR  https://www.cdc.gov/steadi/patient.html For information on Fall & Injury Prevention, visit: [email or web address] OR  [email or web address] OR  https://www.cdc.gov/steadi/patient.html

## 2024-01-11 NOTE — PROGRESS NOTE ADULT - SUBJECTIVE AND OBJECTIVE BOX
[id number]  ECHO VELAZQUEZ    Patient seen and eval at bedside. Patient denies incontinence, saddle anesthesia, difficulty breathing, swallowing. Denies CP, SOB, dizziness. Denies motor/sensory changes. Patient has no complaints and states she feels 70% improved since surgery.    T(C): 36.6 (01-11-24 @ 04:20), Max: 36.8 (01-10-24 @ 17:35)  HR: 67 (01-11-24 @ 04:20) (59 - 67)  BP: 98/61 (01-11-24 @ 04:20) (98/61 - 115/82)  RR: 18 (01-11-24 @ 04:20) (18 - 18)  SpO2: 93% (01-11-24 @ 04:20) (93% - 98%)    PE: NAD, alert awake  Back: Dressing C/D/I, no bleeding or drainage  HV drain output over 12 hrs =   Motor exam:       Upper extremity                         Delt        Bic         Tric       WF      WE                                               R         5/5        5/5        5/5       5/5       5/5                                               L          5/5        5/5        5/5       5/5      5/5           Lower extremeity                         HF         KE          TA       EHL         GS                                                 R        5/5        5/5        5/5       5/5         5/5                                               L         5/5        5/5       5/5       5/5          5/5    SILT C5-T1 B/L, L2-S1 intact B/L, DP pulses 2+ B/L  Calf soft, NT B/L    01-10-24 @ 07:01  -  01-11-24 @ 07:00  --------------------------------------------------------  IN: 0 mL / OUT: 2625 mL / NET: -2625 mL        A/P: s/p lumbar lami/fusion POD#  Pain control  DVT propx  PT - WBAT  Cont drain - monitor output  Bowel regimen  Med following  Dispo: Home pending PT and med clearance [id number]  ECHO VELAZQUEZ    Patient seen and eval at bedside. Patient denies incontinence, saddle anesthesia, difficulty breathing, swallowing. Denies CP, SOB, dizziness. Denies motor/sensory changes. Patient has no complaints and states she feels 70% improved since surgery.    T(C): 36.6 (01-11-24 @ 04:20), Max: 36.8 (01-10-24 @ 17:35)  HR: 67 (01-11-24 @ 04:20) (59 - 67)  BP: 98/61 (01-11-24 @ 04:20) (98/61 - 115/82)  RR: 18 (01-11-24 @ 04:20) (18 - 18)  SpO2: 93% (01-11-24 @ 04:20) (93% - 98%)    PE: NAD, alert awake  Back: Dressing C/D/I, no bleeding or drainage  HV drain output over 12 hrs =   Motor exam:       Upper extremity                         Delt        Bic         Tric       WF      WE                                               R         5/5        5/5        5/5       5/5       5/5                                               L          5/5        5/5        5/5       5/5      5/5           Lower extremeity                         HF         KE          TA       EHL         GS                                                 R        5/5        5/5        5/5       5/5         5/5                                               L         5/5        5/5       5/5       5/5          5/5    SILT C5-T1 B/L, L2-S1 intact B/L, DP pulses 2+ B/L  Calf soft, NT B/L    01-10-24 @ 07:01  -  01-11-24 @ 07:00  --------------------------------------------------------  IN: 0 mL / OUT: 2625 mL / NET: -2625 mL        A/P: s/p lumbar lami/fusion POD#  Pain control  DVT propx  PT - WBAT  Cont drain - monitor output  Bowel regimen  Med following  Dispo: Home pending PT and med clearance [id number]  ECHO VELAZQUEZ    Patient seen and eval at bedside. Patient denies incontinence, saddle anesthesia, difficulty breathing, swallowing. Denies CP, SOB, dizziness. Denies motor/sensory changes. Patient has no complaints and states she feels 70% improved since surgery.    T(C): 36.6 (01-11-24 @ 04:20), Max: 36.8 (01-10-24 @ 17:35)  HR: 67 (01-11-24 @ 04:20) (59 - 67)  BP: 98/61 (01-11-24 @ 04:20) (98/61 - 115/82)  RR: 18 (01-11-24 @ 04:20) (18 - 18)  SpO2: 93% (01-11-24 @ 04:20) (93% - 98%)    PE: NAD, alert awake  Back: Dressing C/D/I, no bleeding or drainage  Motor exam:       Upper extremity                               Delt        Bic         Tric       WF      WE                                               R         5/5        5/5        5/5       5/5       5/5                                               L          5/5        5/5        5/5       5/5      5/5           Lower extremeity                              HF         KE          TA       EHL         GS                                                 R        4-/5       4-/5       4/5       4/5         5/5                                               L         4-5/5     4-/5       4/5       4/5          5/5    SILT C5-T1 B/L, L2-S1 intact B/L, DP pulses 2+ B/L  Calf soft, NT B/L      A/P: s/p L4-5 lumbar interbody fusion laminectomy, posterior lateral fusion POD#3  Pain control  DVT propx: ASA  PT - WBAT  Cont drain - monitor output  LSO for comfort  Bowel regimen  Med following  Dispo: Home today pending PT and med clearance [id number]  ECHO VELAZQUEZ    Patient seen and eval at bedside. Patient denies incontinence, saddle anesthesia, difficulty breathing, swallowing. Denies CP, SOB, dizziness. Denies motor/sensory changes. Patient has no complaints and states she feels 70% improved since surgery.    T(C): 36.6 (01-11-24 @ 04:20), Max: 36.8 (01-10-24 @ 17:35)  HR: 67 (01-11-24 @ 04:20) (59 - 67)  BP: 98/61 (01-11-24 @ 04:20) (98/61 - 115/82)  RR: 18 (01-11-24 @ 04:20) (18 - 18)  SpO2: 93% (01-11-24 @ 04:20) (93% - 98%)    PE: NAD, alert awake  Back: Dressing C/D/I, no bleeding or drainage  Motor exam:       Upper extremity                               Delt        Bic         Tric       WF      WE                                               R         5/5        5/5        5/5       5/5       5/5                                               L          5/5        5/5        5/5       5/5      5/5           Lower extremeity                              HF         KE          TA       EHL         GS                                                 R        4-/5       4-/5       4/5       4/5         5/5                                               L         4-5/5     4-/5       4/5       4/5          5/5    SILT C5-T1 B/L, L2-S1 intact B/L, DP pulses 2+ B/L  Calf soft, NT B/L      A/P: s/p L4-5 lumbar interbody fusion laminectomy, posterior lateral fusion POD#3  Pain control  DVT propx: ASA  PT - WBAT  Cont drain - monitor output  LSO for comfort  Bowel regimen  Med following  Dispo: Home today pending PT and med clearance

## 2024-01-16 PROBLEM — M54.2 CERVICALGIA: Chronic | Status: ACTIVE | Noted: 2023-12-20

## 2024-01-16 PROBLEM — E78.5 HYPERLIPIDEMIA, UNSPECIFIED: Chronic | Status: ACTIVE | Noted: 2023-12-20

## 2024-01-16 PROBLEM — F17.200 NICOTINE DEPENDENCE, UNSPECIFIED, UNCOMPLICATED: Chronic | Status: ACTIVE | Noted: 2023-12-20

## 2024-01-16 PROBLEM — Z87.39 PERSONAL HISTORY OF OTHER DISEASES OF THE MUSCULOSKELETAL SYSTEM AND CONNECTIVE TISSUE: Chronic | Status: ACTIVE | Noted: 2023-12-20

## 2024-01-16 PROBLEM — M47.816 SPONDYLOSIS WITHOUT MYELOPATHY OR RADICULOPATHY, LUMBAR REGION: Chronic | Status: ACTIVE | Noted: 2023-12-20

## 2024-01-16 RX ORDER — OXYCODONE 10 MG/1
10 TABLET ORAL
Qty: 56 | Refills: 0 | Status: ACTIVE | COMMUNITY
Start: 2024-01-16 | End: 1900-01-01

## 2024-01-16 RX ORDER — METHOCARBAMOL 750 MG/1
750 TABLET, FILM COATED ORAL
Qty: 90 | Refills: 0 | Status: ACTIVE | COMMUNITY
Start: 2024-01-16 | End: 1900-01-01

## 2024-01-19 ENCOUNTER — APPOINTMENT (OUTPATIENT)
Dept: ORTHOPEDIC SURGERY | Facility: CLINIC | Age: 59
End: 2024-01-19

## 2024-01-23 ENCOUNTER — APPOINTMENT (OUTPATIENT)
Dept: ORTHOPEDIC SURGERY | Facility: CLINIC | Age: 59
End: 2024-01-23
Payer: MEDICAID

## 2024-01-23 PROCEDURE — 72100 X-RAY EXAM L-S SPINE 2/3 VWS: CPT

## 2024-01-23 PROCEDURE — 99024 POSTOP FOLLOW-UP VISIT: CPT

## 2024-01-23 NOTE — HISTORY OF PRESENT ILLNESS
[___ Weeks Post Op] : [unfilled] weeks post op [2] : the patient reports pain that is 2/10 in severity [Clean/Dry/Intact] : clean, dry and intact [Healed] : healed [Neuro Intact] : an unremarkable neurological exam [Vascular Intact] : ~T peripheral vascular exam normal [Negative Christina's] : maneuvers demonstrated a negative Christina's sign [Xray (Date:___)] : [unfilled] Xray -  [No Obvious Fractures] : no obvious fractures [Hardware in Good Position] : hardware in good position [Good Overall Alignment] : good overall alignment [Doing Well] : is doing well [No Sign of Infection] : is showing no signs of infection [Adequate Pain Control] : has adequate pain control [Chills] : no chills [Fever] : no fever [Erythema] : not erythematous [Discharge] : absent of discharge [Swelling] : not swollen [Dehiscence] : not dehisced [de-identified] : POS:  Lumbar instrumented spinal fusion with pedicle screws at L4, L5, and S1.  Interbody biomechanical graft with arthrodesis at L4-L5 and L5-S1 bilaterally.  Anterior osteotomy via posterior approach with complete decompression of the interspinous ligaments, facets, ligamentum flavum, posterior longitudinal ligament, and annulus at L4-L5 and L5-S1.  Complete Vivas laminectomy at L4 and L5.  Posterolateral fusion with allograft, autograft, demineralized bone matrix at L4-L5 and L5-S1. DOS:01/08/2024 [de-identified] : Patient 2 weeks status post lumbar instrumented fusion with interbody's at 4 5 and 5 1.  Patient is impressed with how well she is doing states she feels fantastic basically complete removal of all preop signs and symptoms of mechanically orientated back pain [de-identified] : Incision is remarkably well-healed is essentially completely healed at this point in time no acute sensory and or motor deficits patient has essentially no preoperative mechanically orientated low back pain [de-identified] : X-rays of the lumbar spine have been reviewed shows two-level instrumented fusion 4 5 and 5 1 [de-identified] : Status post lumbar fusion [de-identified] : Spinal bone growth stimulator applied in the office.  Bone stimulator instructions discussed with the patient physical therapy provided for soft tissue modalities light home exercises discussed and demonstrated follow-up in 3 weeks

## 2024-02-14 ENCOUNTER — APPOINTMENT (OUTPATIENT)
Dept: ORTHOPEDIC SURGERY | Facility: CLINIC | Age: 59
End: 2024-02-14

## 2024-02-26 ENCOUNTER — APPOINTMENT (OUTPATIENT)
Dept: ORTHOPEDIC SURGERY | Facility: CLINIC | Age: 59
End: 2024-02-26
Payer: MEDICAID

## 2024-02-26 PROCEDURE — 99024 POSTOP FOLLOW-UP VISIT: CPT

## 2024-02-26 PROCEDURE — 72100 X-RAY EXAM L-S SPINE 2/3 VWS: CPT

## 2024-02-26 NOTE — HISTORY OF PRESENT ILLNESS
[___ Weeks Post Op] : [unfilled] weeks post op [de-identified] : Lumbar instrumented spinal fusion with pedicle screws at L4, L5, and S1.  Interbody biomechanical graft with arthrodesis at L4-L5 and L5-S1 bilaterally.  Anterior osteotomy via posterior approach with complete decompression of the interspinous ligaments, facets, ligamentum flavum, posterior longitudinal ligament, and annulus at L4-L5 and L5-S1.  Complete Vivas laminectomy at L4 and L5.  Posterolateral fusion with allograft, autograft, demineralized bone matrix at L4-L5 and L5-S1. DOS:01/08/2024. [de-identified] : 58-year-old female presenting today 7 weeks status post two-level lumbar fusion.  Today she reports that she is doing very well.  She reports about 80% improvement in lower back symptoms.  She has been increasing her ambulation and activity throughout the days without any significant pain.  States she is not able to complete her ADLs without any significant functional limitations.  No fevers or chills.  She recently got over a cold causing delay in second postoperative visit.  No incisional drainage.  Overall very pleased with the results of her procedure. [de-identified] : Constitutional: Alert and in no acute distress.  Non-toxic in appearance. Psychiatric: Oriented to person, place, and time. Insight and judgement were intact and the affect was normal. Cardiovascular: Regular rate assessed through peripheral pulses. Pulmonary: Nonlabored breathing on room air. Lymphatics: No peripheral lymphadenopathy appreciated.  Skin: Surgical incision over the lumbar spine is well-healed.  No swelling or tenderness over incisional site. Neurologic: No acute sensory and/or motor deficits. Musculoskeletal: Patient is ambulating well without any assistive devices. No focal motor deficit of the bilateral lower extremities manually. Mild mechanical lumbar back pain, myositis reproducible on palpation as expected. Additional findings included an unremarkable neurological exam, peripheral vascular exam normal and maneuvers demonstrated a negative Christina's sign. [de-identified] : X-ray 2 views of the lumbar spine obtained today 2/26/2024 demonstrated patient status post two-level lumbar fusion.  Hardware is in good position with appropriate alignment.  No evidence of hardware loosening.  No obvious fractures or other osseous abnormalities. [de-identified] : 7-week status post lumbar fusion doing well. [de-identified] : Patient is doing very well s/p two-level lumbar fusion. Expectations of the post-operative recovery period were discussed. At this time, the patient should refrain from repetitive lifting / bending / twisting. No lifting greater than 20 lbs.  She may ambulate for activity as much as tolerated. She may continue with post-op medications as needed. The patient may shower normally. Heat or ice topically as needed. She will return to the office in 4 weeks for repeat clinical exam and x-rays of the lumbar spine. Physical therapy for improving range of motion and strength were also provided today. All questions and concerns were addressed with the patient and they are in agreement with this plan.  This note was generated using dragon medical dictation software. A reasonable effort has been made for proofreading its contents, but typos may still remain. If there are any questions or points of clarification needed please notify my office.

## 2024-03-26 ENCOUNTER — APPOINTMENT (OUTPATIENT)
Dept: ORTHOPEDIC SURGERY | Facility: CLINIC | Age: 59
End: 2024-03-26
Payer: MEDICAID

## 2024-03-26 VITALS
DIASTOLIC BLOOD PRESSURE: 82 MMHG | WEIGHT: 105 LBS | HEIGHT: 67 IN | BODY MASS INDEX: 16.48 KG/M2 | SYSTOLIC BLOOD PRESSURE: 124 MMHG | HEART RATE: 79 BPM

## 2024-03-26 DIAGNOSIS — Z98.1 ARTHRODESIS STATUS: ICD-10-CM

## 2024-03-26 PROCEDURE — 99213 OFFICE O/P EST LOW 20 MIN: CPT | Mod: 25

## 2024-03-26 PROCEDURE — 72100 X-RAY EXAM L-S SPINE 2/3 VWS: CPT

## 2024-03-26 NOTE — REASON FOR VISIT
[Follow-Up Visit] : a follow-up visit for [FreeTextEntry2] : s/p: two-level lumbar fusion DOS: 01/08/2024

## 2024-03-26 NOTE — PHYSICAL EXAM
[de-identified] : CONSTITUTIONAL: The patient is a very pleasant individual who is well-nourished and who appears stated age. PSYCHIATRIC: The patient is alert and oriented X 3 and in no apparent distress, and participates with orthopedic evaluation well. HEAD: Atraumatic and is nonsyndromic in appearance. EENT: No visible thyromegaly, EOMI. RESPIRATORY: Respiratory rate is regular, not dyspneic on examination. LYMPHATICS: There is no inguinal lymphadenopathy INTEGUMENTARY: Skin is clean, dry, and intact about the bilateral lower extremities and lumbar spine.  incision is well-healed VASCULAR: There is brisk capillary refill about the bilateral lower extremities. NEUROLOGIC: There are no pathologic reflexes. There is no decrease in sensation of the bilateral lower extremities on manual  examination. Deep tendon reflexes are well maintained at 2+/4 of the bilateral lower extremities and are symmetric.. MUSCULOSKELETAL: There is no visible muscular atrophy. Manual motor strength is well maintained in the bilateral lower extremities. Range of motion of lumbar spine is well maintained. The patient ambulates in a non-myelopathic manner. Negative tension sign and straight leg raise bilaterally. Quad extension, ankle dorsiflexion, EHL, plantar flexion, and ankle eversion are well preserved. Normal secondary orthopaedic exam of bilateral hips, greater trochanteric area, knees and ankles [de-identified] : Lumbar x-rays been reviewed on today's date 3/26/2024 demonstrates a two-level lumbar instrumented fusion lordosis is well-maintained.  These are compared to 2 previous lumbar fusion postop x-rays.  2 views lumbar spine March 26, 2024

## 2024-03-26 NOTE — HISTORY OF PRESENT ILLNESS
[de-identified] : Nimesh is approximately 3-month status post two-level lumbar instrumented fusion she is doing remarkably well she states essentially she has no back pain at all her radicular complaints are well diminished her activities of daily life the life are doing very very well she wishes to get back to working out physical therapy etc.  Overall ecstatic about the outcome of her spine surgery [0] : a current pain level of 0/10 [Improving] : improving [1] : a maximum pain level of 1/10 [Intermit.] : ~He/She~ states the symptoms seem to be intermittent [Bending] : worsened by bending [Lifting] : worsened by lifting [Rest] : relieved by rest [Ataxia] : no ataxia [Loss of Dexterity] : good dexterity [Incontinence] : no incontinence [Urinary Ret.] : no urinary retention

## 2024-03-26 NOTE — DISCUSSION/SUMMARY
[de-identified] : Very pleasant conversation was conducted with this patient she is 3 months out she is doing very very well she wishes to start physical therapy she is going to continue with home exercises.  Patient is susan follow-up in 3 months topical modalities etc.  Physical therapy started continue with intermittent anti-inflammatories guarded activity no repetitive bending lifting pulling twisting greater than 20-30 pounds she used to be active and working out and bodybuilding so she is averse we also provided her with home exercises

## 2024-04-17 NOTE — DISCHARGE NOTE NURSING/CASE MANAGEMENT/SOCIAL WORK - PATIENT PORTAL LINK FT
Detail Level: Detailed Quality 130: Documentation Of Current Medications In The Medical Record: Current Medications Documented Quality 431: Preventive Care And Screening: Unhealthy Alcohol Use - Screening: Patient not identified as an unhealthy alcohol user when screened for unhealthy alcohol use using a systematic screening method Quality 226: Preventive Care And Screening: Tobacco Use: Screening And Cessation Intervention: Patient screened for tobacco use and is an ex/non-smoker You can access the FollowMyHealth Patient Portal offered by [facility] by registering at the following website: [email or web address]. By joining [employer]’s FollowMyHealth portal, you will also be able to view your health information using other applications (apps) compatible with our system. You can access the FollowMyHealth Patient Portal offered by [facility] by registering at the following website: [email or web address]. By joining [employer]’s FollowMyHealth portal, you will also be able to view your health information using other applications (apps) compatible with our system.

## 2024-06-25 ENCOUNTER — APPOINTMENT (OUTPATIENT)
Dept: ORTHOPEDIC SURGERY | Facility: CLINIC | Age: 59
End: 2024-06-25

## (undated) DEVICE — STRYKER BONE MILL BLADE FINE 3.2MM

## (undated) DEVICE — DRSG MEPILEX 10 X 10CM (4 X 4") AG

## (undated) DEVICE — DRAPE C ARM UNIVERSAL

## (undated) DEVICE — ELCTR ROCKER SWITCH PENCIL BLUE 10FT

## (undated) DEVICE — ELCTR BOVIE TIP BLADE INSULATED 2.75" EDGE WITH SAFETY

## (undated) DEVICE — SUT ETHILON 3-0 18" PS-1

## (undated) DEVICE — GOWN XL W TOWEL

## (undated) DEVICE — WARMING BLANKET UPPER ADULT

## (undated) DEVICE — ELCTR BOVIE PENCIL BLADE 10FT

## (undated) DEVICE — SUT VICRYL 0 18" CT-1 UNDYED (POP-OFF)

## (undated) DEVICE — SUT MONOCRYL 3-0 27" PS-2 UNDYED

## (undated) DEVICE — DRAPE 1/2 SHEET 40X57"

## (undated) DEVICE — DRAPE SPLIT SHEET 77" X 108"

## (undated) DEVICE — MARKING PEN W RULER

## (undated) DEVICE — DRSG DERMABOND 0.7ML

## (undated) DEVICE — GLV 8 PROTEXIS (WHITE)

## (undated) DEVICE — MISONIX BONESCALPEL BLADE LONG CURVED 20MM

## (undated) DEVICE — FOLEY TRAY 16FR LF URINE METER SURESTEP

## (undated) DEVICE — SUT VICRYL 2-0 18" CT-2 (POP-OFF)

## (undated) DEVICE — SOL IRR POUR H2O 1000ML

## (undated) DEVICE — MISONIX BONESCALPEL BLADE LONG CURVED 10MM

## (undated) DEVICE — STRYKER BONE MILL BLADE MEDIUM 5.0MM

## (undated) DEVICE — DRAPE XL SHEET 77X98"

## (undated) DEVICE — DRAPE 3/4 SHEET 52X76"

## (undated) DEVICE — DRAIN JACKSON PRATT 7MM FLAT FULL W 15 FR TROCAR

## (undated) DEVICE — DRSG BIOPATCH DISK W CHG 1" W 7.0MM HOLE

## (undated) DEVICE — SOL IRR POUR NS 0.9% 1000ML

## (undated) DEVICE — VENODYNE/SCD SLEEVE CALF MEDIUM

## (undated) DEVICE — POSITIONER JACKSON TABLE HEADREST 7", CHEST, HIP, THIGH PADS

## (undated) DEVICE — POSITIONER FOAM LAMINECTOMY ARM CRADLE (PINK)

## (undated) DEVICE — MISONIX BONESCALPEL IRRIGATION TUBE SET

## (undated) DEVICE — ELCTR GROUNDING PAD ADULT COVIDIEN

## (undated) DEVICE — FRAZIER CONNECTING TUBE 2FT 5MM

## (undated) DEVICE — WOUND IRR SURGIPHOR

## (undated) DEVICE — DRSG XEROFORM 5 X 9"

## (undated) DEVICE — POSITIONER JACKSON TABLE HEADREST 7", CHEST, HIP, THIGH PADS, ARM CRADLE

## (undated) DEVICE — DRSG MEPILEX 10 X 25CM (4 X 10") POST OP AG SILVER

## (undated) DEVICE — POSITIONER FOAM EGG CRATE ULNAR 2PCS (PINK)

## (undated) DEVICE — DRAPE TOWEL 1000 SMALL 17" X 11"

## (undated) DEVICE — DRAPE STICKY U BLUE 60 X 84"

## (undated) DEVICE — DRAPE TOWEL BLUE 17" X 24"

## (undated) DEVICE — TUBING BIPOLAR IRRIGATOR AND CORD SET

## (undated) DEVICE — DRAPE INSTRUMENT POUCH 6.75" X 11"

## (undated) DEVICE — TUBING FOR SMOKE EVACUATOR (PURPLE END)

## (undated) DEVICE — DRSG TELFA 3 X 4

## (undated) DEVICE — DRSG STERISTRIPS 0.5 X 4"

## (undated) DEVICE — MIDAS REX LEGEND MATCH HEAD FLUTED SM BORE 3.0MM X 10CM

## (undated) DEVICE — MISONIX BONESCALPEL STANDARD BLUNT BLADE 10MM

## (undated) DEVICE — DRAIN RESERVOIR FOR JACKSON PRATT 100CC CARDINAL

## (undated) DEVICE — PACK NEURO

## (undated) DEVICE — PREP DURAPREP 26CC